# Patient Record
Sex: MALE | Race: WHITE | Employment: FULL TIME | URBAN - METROPOLITAN AREA
[De-identification: names, ages, dates, MRNs, and addresses within clinical notes are randomized per-mention and may not be internally consistent; named-entity substitution may affect disease eponyms.]

---

## 2024-08-14 ENCOUNTER — TELEPHONE (OUTPATIENT)
Dept: FAMILY MEDICINE | Facility: CLINIC | Age: 64
End: 2024-08-14
Payer: COMMERCIAL

## 2024-08-14 NOTE — TELEPHONE ENCOUNTER
----- Message from Tomeka Camiol, Patient Care Assistant sent at 8/14/2024  1:43 PM CDT -----  Regarding: appointment  Contact: Caridad arreaga's wife  Type:  Sooner Appointment Request    Caller is requesting a sooner appointment.  Caller declined first available appointment listed below.  Caller will not accept being placed on the waitlist and is requesting a message be sent to doctor.    Name of Caller:  Caridad arreaga's wife    When is the first available appointment?  2024    Symptoms:  new pcp     Would the patient rather a call back or a response via [facility]? Callback     Best Call Back Number:  [phone]     Additional Information:  please call to advise. Thanks!

## 2024-10-18 ENCOUNTER — OFFICE VISIT (OUTPATIENT)
Dept: FAMILY MEDICINE | Facility: CLINIC | Age: 64
End: 2024-10-18
Payer: COMMERCIAL

## 2024-10-18 VITALS
BODY MASS INDEX: 33.14 KG/M2 | WEIGHT: 231.5 LBS | OXYGEN SATURATION: 97 % | HEIGHT: 70 IN | SYSTOLIC BLOOD PRESSURE: 138 MMHG | HEART RATE: 96 BPM | DIASTOLIC BLOOD PRESSURE: 60 MMHG

## 2024-10-18 DIAGNOSIS — Z76.89 ENCOUNTER TO ESTABLISH CARE: ICD-10-CM

## 2024-10-18 DIAGNOSIS — I10 ESSENTIAL HYPERTENSION, BENIGN: ICD-10-CM

## 2024-10-18 DIAGNOSIS — Z13.220 ENCOUNTER FOR SCREENING FOR LIPID DISORDER: ICD-10-CM

## 2024-10-18 DIAGNOSIS — Z11.3 SCREEN FOR STD (SEXUALLY TRANSMITTED DISEASE): ICD-10-CM

## 2024-10-18 DIAGNOSIS — Z12.5 SPECIAL SCREENING, PROSTATE CANCER: Primary | ICD-10-CM

## 2024-10-18 DIAGNOSIS — Z13.1 DIABETES MELLITUS SCREENING: ICD-10-CM

## 2024-10-18 DIAGNOSIS — Z11.59 ENCOUNTER FOR HEPATITIS C SCREENING TEST FOR LOW RISK PATIENT: ICD-10-CM

## 2024-10-18 DIAGNOSIS — Z00.00 PREVENTATIVE HEALTH CARE: ICD-10-CM

## 2024-10-18 DIAGNOSIS — N40.0 BENIGN PROSTATIC HYPERPLASIA WITHOUT LOWER URINARY TRACT SYMPTOMS: ICD-10-CM

## 2024-10-18 RX ORDER — DILTIAZEM HYDROCHLORIDE EXTENDED-RELEASE TABLETS 360 MG/1
360 TABLET, EXTENDED RELEASE ORAL
COMMUNITY
Start: 2024-08-07

## 2024-10-18 RX ORDER — TADALAFIL 10 MG/1
TABLET ORAL
COMMUNITY

## 2024-10-18 NOTE — ASSESSMENT & PLAN NOTE
To consider adding a small dose of an Ace or an Arb to his calcium channel blocker  Diet , wt loss , exercise d/w patient

## 2024-10-18 NOTE — PROGRESS NOTES
Subjective:       Patient ID: Milo Nicole is a 64 y.o. male.    Chief Complaint: Establish Care (Patient is here to establish care. )      Mr Nicole is a new patient who presents today to establish care and for management of the chronic problems , refills and preventive medicine      Follow-up  This is a chronic problem. The current episode started more than 1 year ago. The problem has been unchanged. Pertinent negatives include no fever. The symptoms are aggravated by stress and eating. Treatments tried: See med list.     Review of Systems   Constitutional:  Negative for activity change, appetite change and fever.   Respiratory: Negative.     Cardiovascular: Negative.    Gastrointestinal: Negative.    Musculoskeletal: Negative.          Objective:      Physical Exam  Vitals and nursing note reviewed.   Constitutional:       Appearance: Normal appearance. He is obese.   Cardiovascular:      Rate and Rhythm: Normal rate and regular rhythm.      Pulses: Normal pulses.      Heart sounds: Normal heart sounds. No murmur heard.     No friction rub. No gallop.   Pulmonary:      Effort: Pulmonary effort is normal.      Breath sounds: Normal breath sounds. No wheezing.   Skin:     General: Skin is warm and dry.   Neurological:      Mental Status: He is alert.   Psychiatric:         Mood and Affect: Mood normal.         Assessment:       1. Special screening, prostate cancer  -     Prostate Specific Antigen, Diagnostic; Future; Expected date: 10/18/2024    2. Screen for STD (sexually transmitted disease)  -     HIV 1/2 Ag/Ab (4th Gen); Future; Expected date: 04/18/2025    3. Encounter for hepatitis C screening test for low risk patient  -     Hepatitis C Antibody; Future; Expected date: 10/18/2024    4. Encounter for screening for lipid disorder  -     Lipid Panel; Future; Expected date: 10/18/2024    5. Diabetes mellitus screening  -     Hemoglobin A1C; Future; Expected date: 10/18/2024    6. Essential hypertension,  benign  Overview:  Blood pressure near goal today    Assessment & Plan:  To consider adding a small dose of an Ace or an Arb to his calcium channel blocker  Diet , wt loss , exercise d/w patient      Orders:  -     Microalbumin/Creatinine Ratio, Urine; Future; Expected date: 10/18/2024  -     Comprehensive Metabolic Panel; Future; Expected date: 10/18/2024    7. Encounter to establish care  Overview:  Moved recently from [place]  History of SVT  History of hypertension  History of ED  History of BPH    Assessment & Plan:  Await old rec  Screen for diabetes lipids renal function  Screen for hep C HIV  Get a PSA      8. Benign prostatic hyperplasia without lower urinary tract symptoms  Overview:  Stable     Assessment & Plan:  Get PSA  Cont cialis      9. Preventative health care  Overview:  See below    Assessment & Plan:  We will discuss vaccination and colonoscope next             Plan:       1. Special screening, prostate cancer  -     Prostate Specific Antigen, Diagnostic; Future; Expected date: 10/18/2024    2. Screen for STD (sexually transmitted disease)  -     HIV 1/2 Ag/Ab (4th Gen); Future; Expected date: 04/18/2025    3. Encounter for hepatitis C screening test for low risk patient  -     Hepatitis C Antibody; Future; Expected date: 10/18/2024    4. Encounter for screening for lipid disorder  -     Lipid Panel; Future; Expected date: 10/18/2024    5. Diabetes mellitus screening  -     Hemoglobin A1C; Future; Expected date: 10/18/2024    6. Essential hypertension, benign  Overview:  Blood pressure near goal today    Assessment & Plan:  To consider adding a small dose of an Ace or an Arb to his calcium channel blocker  Diet , wt loss , exercise d/w patient      Orders:  -     Microalbumin/Creatinine Ratio, Urine; Future; Expected date: 10/18/2024  -     Comprehensive Metabolic Panel; Future; Expected date: 10/18/2024    7. Encounter to establish care  Overview:  Moved recently from [place]  History of SVT  History  of hypertension  History of ED  History of BPH    Assessment & Plan:  Await old rec  Screen for diabetes lipids renal function  Screen for hep C HIV  Get a PSA      8. Benign prostatic hyperplasia without lower urinary tract symptoms  Overview:  Stable     Assessment & Plan:  Get PSA  Cont cialis      9. Preventative health care  Overview:  See below    Assessment & Plan:  We will discuss vaccination and colonoscope next            Visit today included increased complexity associated with the care of the episodic problem.   I addressed and managing the longitudinal care of the patient due to the serious and/or complex managed problem(s).  .

## 2024-10-21 ENCOUNTER — LAB VISIT (OUTPATIENT)
Dept: LAB | Facility: CLINIC | Age: 64
End: 2024-10-21
Payer: COMMERCIAL

## 2024-10-21 DIAGNOSIS — Z13.1 DIABETES MELLITUS SCREENING: ICD-10-CM

## 2024-10-21 DIAGNOSIS — Z13.220 ENCOUNTER FOR SCREENING FOR LIPID DISORDER: ICD-10-CM

## 2024-10-21 DIAGNOSIS — Z11.3 SCREEN FOR STD (SEXUALLY TRANSMITTED DISEASE): ICD-10-CM

## 2024-10-21 DIAGNOSIS — Z12.5 SPECIAL SCREENING, PROSTATE CANCER: ICD-10-CM

## 2024-10-21 DIAGNOSIS — Z11.59 ENCOUNTER FOR HEPATITIS C SCREENING TEST FOR LOW RISK PATIENT: ICD-10-CM

## 2024-10-21 DIAGNOSIS — I10 ESSENTIAL HYPERTENSION, BENIGN: ICD-10-CM

## 2024-10-21 LAB
ALBUMIN SERPL BCP-MCNC: 3.9 G/DL (ref 3.5–5.2)
ALP SERPL-CCNC: 53 U/L (ref 40–150)
ALT SERPL W/O P-5'-P-CCNC: 10 U/L (ref 10–44)
ANION GAP SERPL CALC-SCNC: 10 MMOL/L (ref 8–16)
AST SERPL-CCNC: 13 U/L (ref 10–40)
BILIRUB SERPL-MCNC: 0.8 MG/DL (ref 0.1–1)
BUN SERPL-MCNC: 17 MG/DL (ref 8–23)
CALCIUM SERPL-MCNC: 9.3 MG/DL (ref 8.7–10.5)
CHLORIDE SERPL-SCNC: 105 MMOL/L (ref 95–110)
CHOLEST SERPL-MCNC: 183 MG/DL (ref 120–199)
CHOLEST/HDLC SERPL: 4.2 {RATIO} (ref 2–5)
CO2 SERPL-SCNC: 25 MMOL/L (ref 23–29)
COMPLEXED PSA SERPL-MCNC: 2.2 NG/ML (ref 0–4)
CREAT SERPL-MCNC: 1.1 MG/DL (ref 0.5–1.4)
EST. GFR  (NO RACE VARIABLE): >60 ML/MIN/1.73 M^2
ESTIMATED AVG GLUCOSE: 103 MG/DL (ref 68–131)
GLUCOSE SERPL-MCNC: 103 MG/DL (ref 70–110)
HBA1C MFR BLD: 5.2 % (ref 4–5.6)
HCV AB SERPL QL IA: NORMAL
HDLC SERPL-MCNC: 44 MG/DL (ref 40–75)
HDLC SERPL: 24 % (ref 20–50)
HIV 1+2 AB+HIV1 P24 AG SERPL QL IA: NORMAL
LDLC SERPL CALC-MCNC: 121.2 MG/DL (ref 63–159)
NONHDLC SERPL-MCNC: 139 MG/DL
POTASSIUM SERPL-SCNC: 4.4 MMOL/L (ref 3.5–5.1)
PROT SERPL-MCNC: 6.8 G/DL (ref 6–8.4)
SODIUM SERPL-SCNC: 140 MMOL/L (ref 136–145)
TRIGL SERPL-MCNC: 89 MG/DL (ref 30–150)

## 2024-10-21 PROCEDURE — 87389 HIV-1 AG W/HIV-1&-2 AB AG IA: CPT | Performed by: INTERNAL MEDICINE

## 2024-10-21 PROCEDURE — 80053 COMPREHEN METABOLIC PANEL: CPT | Performed by: INTERNAL MEDICINE

## 2024-10-21 PROCEDURE — 83036 HEMOGLOBIN GLYCOSYLATED A1C: CPT | Performed by: INTERNAL MEDICINE

## 2024-10-21 PROCEDURE — 80061 LIPID PANEL: CPT | Performed by: INTERNAL MEDICINE

## 2024-10-21 PROCEDURE — 84153 ASSAY OF PSA TOTAL: CPT | Performed by: INTERNAL MEDICINE

## 2024-10-21 PROCEDURE — 86803 HEPATITIS C AB TEST: CPT | Performed by: INTERNAL MEDICINE

## 2024-10-21 PROCEDURE — 36415 COLL VENOUS BLD VENIPUNCTURE: CPT | Mod: ,,, | Performed by: INTERNAL MEDICINE

## 2024-10-22 LAB
ALBUMIN/CREAT UR: 3.5 UG/MG (ref 0–30)
CREAT UR-MCNC: 172 MG/DL (ref 23–375)
MICROALBUMIN UR DL<=1MG/L-MCNC: 6 UG/ML

## 2024-12-06 ENCOUNTER — OFFICE VISIT (OUTPATIENT)
Dept: FAMILY MEDICINE | Facility: CLINIC | Age: 64
End: 2024-12-06
Payer: COMMERCIAL

## 2024-12-06 ENCOUNTER — TELEPHONE (OUTPATIENT)
Dept: FAMILY MEDICINE | Facility: CLINIC | Age: 64
End: 2024-12-06

## 2024-12-06 VITALS
HEART RATE: 72 BPM | DIASTOLIC BLOOD PRESSURE: 80 MMHG | BODY MASS INDEX: 33.02 KG/M2 | OXYGEN SATURATION: 98 % | SYSTOLIC BLOOD PRESSURE: 126 MMHG | WEIGHT: 230.63 LBS | HEIGHT: 70 IN

## 2024-12-06 DIAGNOSIS — Z00.00 ANNUAL PHYSICAL EXAM: ICD-10-CM

## 2024-12-06 DIAGNOSIS — N52.01 ERECTILE DYSFUNCTION DUE TO ARTERIAL INSUFFICIENCY: ICD-10-CM

## 2024-12-06 DIAGNOSIS — N52.9 ERECTILE DYSFUNCTION, UNSPECIFIED ERECTILE DYSFUNCTION TYPE: ICD-10-CM

## 2024-12-06 DIAGNOSIS — Z12.12 SCREENING FOR MALIGNANT NEOPLASM OF THE RECTUM: Primary | ICD-10-CM

## 2024-12-06 DIAGNOSIS — I10 ESSENTIAL HYPERTENSION, BENIGN: ICD-10-CM

## 2024-12-06 RX ORDER — SILDENAFIL 100 MG/1
100 TABLET, FILM COATED ORAL DAILY PRN
Qty: 10 TABLET | Refills: 2 | Status: SHIPPED | OUTPATIENT
Start: 2024-12-06 | End: 2025-03-06

## 2024-12-06 NOTE — ASSESSMENT & PLAN NOTE
Diet , wt loss , exercise d/w patient  I gave the patient verbal recommendations re the outstanding vaccinations.  Order a C-scope

## 2024-12-06 NOTE — TELEPHONE ENCOUNTER
Patient is wanting to know if there is something else that he can take besides sildenafiL (VIAGRA) 100 MG . Pt states that this medication is to high.

## 2024-12-06 NOTE — ASSESSMENT & PLAN NOTE
Switch to viagra 100mg  We had a lengthy discussion regarding T replacement and possible side effects including prostate cancer and patient declined

## 2024-12-06 NOTE — TELEPHONE ENCOUNTER
----- Message from Lacey sent at 12/6/2024  1:35 PM CST -----  Contact: self  Type:  Needs Medical Advice    Who Called: self  Symptoms (please be specific): pt is requesting to speak to dr or nurse about his medication, sildenafiL (VIAGRA) 100 MG tablet its too high and needed to know what he should do,  mentioned about good rx coupon. Please call pt to discuss.     Would the patient rather a call back or a response via [facility]? call  Best Call Back Number: [phone] (home)     Additional Information: please advise and thank you.

## 2024-12-06 NOTE — PROGRESS NOTES
Subjective:       Patient ID: Milo Nicole is a 64 y.o. male.    Chief Complaint: Follow-up and Annual Exam      History of Present Illness    CHIEF COMPLAINT:  Milo presents for a follow-up visit to discuss recent lab results and to address erectile dysfunction concerns.    HPI:  Milo reports erectile dysfunction (ED). He has been prescribed Tadalafil (Cialis) 10 mg, which has provided partial relief. He inquires about testosterone testing and alternative treatments for ED, expressing interest in potentially trying Viagra 100 mg. Milo is overdue for a colonoscopy, with a history of polyps found in previous screenings. During his last colonoscopy in [place], there was a concern about his cecum, which led to an MRI. The follow-up revealed an atypically shaped prostate. He subsequently had a bladder scope performed by a urologist, Dr. Max, which showed no abnormalities. Milo denies any issues with urination or bladder cancer.    MEDICATIONS:  Milo is on Tadalafil (Cialis) 10 mg for erectile dysfunction (ED), which is partially effective.    MEDICAL HISTORY:  Milo has a history of erectile dysfunction and colonic polyps. He has previously received a COVID-19 booster.    SURGICAL HISTORY:  Milo underwent a colonoscopy in the past where polyps were found and removed. He also had a bladder scope procedure performed by Dr. Max in [place], following concerns from the colonoscopy.    TEST RESULTS:  Recent lab results show all complete blood chemistry values within normal range. His cholesterol level was 183, A1C was 5.2, and PSA was normal. Hepatitis C and HIV tests were non-reactive. Urinalysis, kidney function, and liver function tests were all normal.    IMAGING:  Milo had an MRI in the past, performed after his colonoscopy in [place], which showed an atypically shaped prostate.         Review of Systems   Constitutional:  Negative for activity change, appetite change and fever.   Respiratory: Negative.      Cardiovascular: Negative.    Gastrointestinal: Negative.    Genitourinary:  Positive for erectile dysfunction.   Musculoskeletal: Negative.          Objective:      Physical Exam  Vitals and nursing note reviewed.   Constitutional:       Appearance: Normal appearance. He is obese.   Cardiovascular:      Rate and Rhythm: Normal rate and regular rhythm.      Pulses: Normal pulses.      Heart sounds: Normal heart sounds. No murmur heard.     No friction rub. No gallop.   Pulmonary:      Effort: Pulmonary effort is normal.      Breath sounds: Normal breath sounds. No wheezing.   Skin:     General: Skin is warm and dry.   Neurological:      Mental Status: He is alert.   Psychiatric:         Mood and Affect: Mood normal.         Assessment:       1. Screening for malignant neoplasm of the rectum  -     Case Request Endoscopy: COLONOSCOPY    2. Erectile dysfunction, unspecified erectile dysfunction type  -     sildenafiL (VIAGRA) 100 MG tablet; Take 1 tablet (100 mg total) by mouth daily as needed for Erectile Dysfunction.  Dispense: 10 tablet; Refill: 2    3. Annual physical exam  Overview:  Patient presents for a wellness visit  No new c/o today  Please refer to initial intake notes for screening/preventive measures  All histories and immunization schedule reviewed with patient      Assessment & Plan:  Diet , wt loss , exercise d/w patient  I gave the patient verbal recommendations re the outstanding vaccinations.  Order a C-scope        4. Erectile dysfunction due to arterial insufficiency  Overview:  Inadequately Rx    Assessment & Plan:  Switch to viagra 100mg  We had a lengthy discussion regarding T replacement and possible side effects including prostate cancer and patient declined              Plan:       Assessment & Plan    IMPRESSION:  - Reviewed patient's recent labs results, noting all values within normal range including cholesterol, A1C, PSA, and kidney/liver function  - Considered patient's history of  polyps in previous colonoscopy when ordering new screening colonoscopy  - Evaluated current ED treatment with Tadalafil 10mg, determining to try alternative medication (sildenafil) at higher dose  - Decided against pursuing testosterone therapy due to potential increased risk of prostate cancer    MALE ERECTILE DYSFUNCTION:  - Discussed availability and cost-effectiveness of generic ED medications through discount programs like [employer].  - Discontinued Tadalafil 10mg.  - Started sildenafil 100mg, #30 tablets with refills.    TESTICULAR HYPOFUNCTION:  - Explained rationale for avoiding testosterone therapy due to potential increased risk of prostate cancer.    SCREENING FOR COLON CANCER:  - Ordered screening colonoscopy.    GENERAL ADULT MEDICAL EXAMINATION:  - Follow up in 6 months.       Milo was seen today for follow-up and annual exam.    Diagnoses and all orders for this visit:    Screening for malignant neoplasm of the rectum  -     Case Request Endoscopy: COLONOSCOPY    Erectile dysfunction, unspecified erectile dysfunction type  -     sildenafiL (VIAGRA) 100 MG tablet; Take 1 tablet (100 mg total) by mouth daily as needed for Erectile Dysfunction.    Annual physical exam    Erectile dysfunction due to arterial insufficiency

## 2024-12-09 ENCOUNTER — PATIENT MESSAGE (OUTPATIENT)
Dept: GASTROENTEROLOGY | Facility: CLINIC | Age: 64
End: 2024-12-09
Payer: COMMERCIAL

## 2025-01-17 ENCOUNTER — ANESTHESIA (OUTPATIENT)
Dept: ENDOSCOPY | Facility: HOSPITAL | Age: 65
End: 2025-01-17
Payer: COMMERCIAL

## 2025-01-17 ENCOUNTER — ANESTHESIA EVENT (OUTPATIENT)
Dept: ENDOSCOPY | Facility: HOSPITAL | Age: 65
End: 2025-01-17
Payer: COMMERCIAL

## 2025-01-17 ENCOUNTER — HOSPITAL ENCOUNTER (OUTPATIENT)
Facility: HOSPITAL | Age: 65
Discharge: HOME | End: 2025-01-17
Attending: INTERNAL MEDICINE | Admitting: INTERNAL MEDICINE
Payer: COMMERCIAL

## 2025-01-17 VITALS
HEIGHT: 70 IN | HEART RATE: 70 BPM | DIASTOLIC BLOOD PRESSURE: 89 MMHG | BODY MASS INDEX: 32.93 KG/M2 | WEIGHT: 230 LBS | RESPIRATION RATE: 14 BRPM | TEMPERATURE: 98 F | OXYGEN SATURATION: 97 % | SYSTOLIC BLOOD PRESSURE: 151 MMHG

## 2025-01-17 DIAGNOSIS — K64.8 INTERNAL HEMORRHOIDS: ICD-10-CM

## 2025-01-17 DIAGNOSIS — K31.7 GASTRIC POLYPS: ICD-10-CM

## 2025-01-17 DIAGNOSIS — K63.5 POLYP OF COLON, UNSPECIFIED PART OF COLON, UNSPECIFIED TYPE: ICD-10-CM

## 2025-01-17 DIAGNOSIS — K57.90 DIVERTICULOSIS: ICD-10-CM

## 2025-01-17 DIAGNOSIS — K29.70 GASTRITIS, PRESENCE OF BLEEDING UNSPECIFIED, UNSPECIFIED CHRONICITY, UNSPECIFIED GASTRITIS TYPE: Primary | ICD-10-CM

## 2025-01-17 DIAGNOSIS — K44.9 HIATAL HERNIA: ICD-10-CM

## 2025-01-17 DIAGNOSIS — Z86.0100 HISTORY OF COLON POLYPS: ICD-10-CM

## 2025-01-17 PROCEDURE — 27201089 HC SNARE, DISP (ANY): Performed by: INTERNAL MEDICINE

## 2025-01-17 PROCEDURE — 63600175 PHARM REV CODE 636 W HCPCS: Performed by: NURSE ANESTHETIST, CERTIFIED REGISTERED

## 2025-01-17 PROCEDURE — 37000009 HC ANESTHESIA EA ADD 15 MINS: Performed by: INTERNAL MEDICINE

## 2025-01-17 PROCEDURE — D9220A PRA ANESTHESIA: Mod: CRNA,,, | Performed by: NURSE ANESTHETIST, CERTIFIED REGISTERED

## 2025-01-17 PROCEDURE — 27200997: Performed by: INTERNAL MEDICINE

## 2025-01-17 PROCEDURE — 25000003 PHARM REV CODE 250: Performed by: INTERNAL MEDICINE

## 2025-01-17 PROCEDURE — 45380 COLONOSCOPY AND BIOPSY: CPT | Mod: 22,59,33, | Performed by: INTERNAL MEDICINE

## 2025-01-17 PROCEDURE — 45385 COLONOSCOPY W/LESION REMOVAL: CPT | Mod: 22,59,33 | Performed by: INTERNAL MEDICINE

## 2025-01-17 PROCEDURE — 45385 COLONOSCOPY W/LESION REMOVAL: CPT | Mod: 22,59,33, | Performed by: INTERNAL MEDICINE

## 2025-01-17 PROCEDURE — 37000008 HC ANESTHESIA 1ST 15 MINUTES: Performed by: INTERNAL MEDICINE

## 2025-01-17 PROCEDURE — 27201028 HC NEEDLE, SCLERO: Performed by: INTERNAL MEDICINE

## 2025-01-17 PROCEDURE — 45380 COLONOSCOPY AND BIOPSY: CPT | Mod: 22,59,33 | Performed by: INTERNAL MEDICINE

## 2025-01-17 PROCEDURE — 45390 COLONOSCOPY W/RESECTION: CPT | Mod: 22,33,, | Performed by: INTERNAL MEDICINE

## 2025-01-17 PROCEDURE — 45390 COLONOSCOPY W/RESECTION: CPT | Mod: 22,33 | Performed by: INTERNAL MEDICINE

## 2025-01-17 PROCEDURE — D9220A PRA ANESTHESIA: Mod: ANES,,, | Performed by: ANESTHESIOLOGY

## 2025-01-17 PROCEDURE — 27201012 HC FORCEPS, HOT/COLD, DISP: Performed by: INTERNAL MEDICINE

## 2025-01-17 RX ORDER — SODIUM CHLORIDE 9 MG/ML
INJECTION, SOLUTION INTRAVENOUS CONTINUOUS
Status: DISCONTINUED | OUTPATIENT
Start: 2025-01-17 | End: 2025-01-17 | Stop reason: HOSPADM

## 2025-01-17 RX ORDER — LIDOCAINE HYDROCHLORIDE 20 MG/ML
INJECTION INTRAVENOUS
Status: DISCONTINUED | OUTPATIENT
Start: 2025-01-17 | End: 2025-01-17

## 2025-01-17 RX ORDER — PROPOFOL 10 MG/ML
VIAL (ML) INTRAVENOUS
Status: DISCONTINUED | OUTPATIENT
Start: 2025-01-17 | End: 2025-01-17

## 2025-01-17 RX ADMIN — PROPOFOL 40 MG: 10 INJECTION, EMULSION INTRAVENOUS at 07:01

## 2025-01-17 RX ADMIN — PROPOFOL 100 MG: 10 INJECTION, EMULSION INTRAVENOUS at 07:01

## 2025-01-17 RX ADMIN — SODIUM CHLORIDE: 9 INJECTION, SOLUTION INTRAVENOUS at 07:01

## 2025-01-17 RX ADMIN — LIDOCAINE HYDROCHLORIDE 100 MG: 20 INJECTION, SOLUTION INTRAVENOUS at 07:01

## 2025-01-17 NOTE — TRANSFER OF CARE
"Anesthesia Transfer of Care Note    Patient: Milo Nicole    Procedure(s) Performed: Procedure(s) (LRB):  COLONOSCOPY, SCREENING, HIGH RISK PATIENT (N/A)    Patient location: GI    Anesthesia Type: general    Transport from OR: Transported from OR on room air with adequate spontaneous ventilation    Post pain: adequate analgesia    Post assessment: no apparent anesthetic complications and tolerated procedure well    Post vital signs: stable    Level of consciousness: sedated and responds to stimulation    Nausea/Vomiting: no nausea/vomiting    Complications: none    Transfer of care protocol was followed      Last vitals: Visit Vitals  BP (!) 130/90 (BP Location: Left arm, Patient Position: Lying)   Pulse 66   Temp 36.5 °C (97.7 °F) (Skin)   Resp 16   Ht 5' 10" (1.778 m)   Wt 104.3 kg (230 lb)   SpO2 98%   BMI 33.00 kg/m²     "

## 2025-01-17 NOTE — PLAN OF CARE
Vss, keshawn po fluids, denies pain, ambulates easily. IV removed, catheter intact. Discharge instructions provided and states understanding. States ready to go home.  Discharged from facility with family per wheelchair.

## 2025-01-17 NOTE — ANESTHESIA PREPROCEDURE EVALUATION
01/17/2025  Milo Nicole is a 64 y.o., male.      Pre-op Assessment    I have reviewed the NPO Status.   I have reviewed the Medications.     Review of Systems  Anesthesia Hx:  No problems with previous Anesthesia                Cardiovascular:  Exercise tolerance: good   Hypertension                                    Hypertension         Hepatic/GI:  Bowel Prep.                   Endocrine:        Obesity / BMI > 30      Physical Exam  General: Well nourished    Airway:  Mallampati: II   Mouth Opening: Normal  TM Distance: Normal  Tongue: Normal  Neck ROM: Normal ROM    Dental:  Intact    Chest/Lungs:  Clear to auscultation, Normal Respiratory Rate        Anesthesia Plan  Type of Anesthesia, risks & benefits discussed:    Anesthesia Type: Gen Natural Airway  Intra-op Monitoring Plan: Standard ASA Monitors  Induction:  IV  Informed Consent: Informed consent signed with the Patient and all parties understand the risks and agree with anesthesia plan.  All questions answered.   ASA Score: 2    Ready For Surgery From Anesthesia Perspective.     .

## 2025-01-17 NOTE — PROVATION PATIENT INSTRUCTIONS
Discharge Summary/Instructions after an Endoscopic Procedure  Patient Name: Milo Nicole  Patient MRN: [id number]  Patient YOB: 1960 Friday, January 17, 2025  Matty Mercado MD  Dear patient,  As a result of recent federal legislation (The Federal Cures Act), you may   receive lab or pathology results from your procedure in your [facility]   account before your physician is able to contact you. Your physician or   their representative will relay the results to you with their   recommendations at their soonest availability.  Thank you,  RESTRICTIONS:  During your procedure today, you received medications for sedation.  These   medications may affect your judgment, balance and coordination.  Therefore,   for 24 hours, you have the following restrictions:   - DO NOT drive a car, operate machinery, make legal/financial decisions,   sign important papers or drink alcohol.    ACTIVITY:  Today: no heavy lifting, straining or running due to procedural   sedation/anesthesia.  The following day: return to full activity including work.  DIET:  Eat and drink normally unless instructed otherwise.     TREATMENT FOR COMMON SIDE EFFECTS:  - Mild abdominal pain, nausea, belching, bloating or excessive gas:  rest,   eat lightly and use a heating pad.  - Sore Throat: treat with throat lozenges and/or gargle with warm salt   water.  - Because air was used during the procedure, expelling large amounts of air   from your rectum or belching is normal.  - If a bowel prep was taken, you may not have a bowel movement for 1-3 days.    This is normal.  SYMPTOMS TO WATCH FOR AND REPORT TO YOUR PHYSICIAN:  1. Abdominal pain or bloating, other than gas cramps.  2. Chest pain.  3. Back pain.  4. Signs of infection such as: chills or fever occurring within 24 hours   after the procedure.  5. Rectal bleeding, which would show as bright red, maroon, or black stools.   (A tablespoon of blood from the rectum is not serious, especially  if   hemorrhoids are present.)  6. Vomiting.  7. Weakness or dizziness.  GO DIRECTLY TO THE NEAREST EMERGENCY ROOM IF YOU HAVE ANY OF THE FOLLOWING:      Difficulty breathing              Chills and/or fever over 101 F   Persistent vomiting and/or vomiting blood   Severe abdominal pain   Severe chest pain   Black, tarry stools   Bleeding- more than one tablespoon   Any other symptom or condition that you feel may need urgent attention  Your doctor recommends these additional instructions:  If any biopsies were taken, your doctors clinic will contact you in 1 to 2   weeks with any results.  - Patient has a contact number available for emergencies.  The signs and   symptoms of potential delayed complications were discussed with the   patient.  Return to normal activities tomorrow.  Written discharge   instructions were provided to the patient.   - High fiber diet.   - Continue present medications.   - No aspirin, ibuprofen, naproxen, or other non-steroidal anti-inflammatory   drugs for 2 weeks after polyp removal.   - Await pathology results.   - Repeat colonoscopy in 3 months for surveillance after piecemeal   polypectomy.   - Discharge patient to home (ambulatory).   - Return to GI office PRN.  For questions, problems or results please call your physician - Matty Mercado MD at Work:  [phone].  [facility], EMERGENCY ROOM PHONE NUMBER: [phone]  IF A COMPLICATION OR EMERGENCY SITUATION ARISES AND YOU ARE UNABLE TO REACH   YOUR PHYSICIAN - GO DIRECTLY TO THE EMERGENCY ROOM.  Matty Mercado MD  1/17/2025 8:07:23 AM  This report has been verified and signed electronically.  Dear patient,  As a result of recent federal legislation (The Federal Cures Act), you may   receive lab or pathology results from your procedure in your [facility]   account before your physician is able to contact you. Your physician or   their representative will relay the results to you with their   recommendations at their  soonest availability.  Thank you,  PROVATION

## 2025-01-27 ENCOUNTER — TELEPHONE (OUTPATIENT)
Dept: GASTROENTEROLOGY | Facility: CLINIC | Age: 65
End: 2025-01-27
Payer: COMMERCIAL

## 2025-01-27 ENCOUNTER — TELEPHONE (OUTPATIENT)
Dept: SURGERY | Facility: CLINIC | Age: 65
End: 2025-01-27
Payer: COMMERCIAL

## 2025-01-27 DIAGNOSIS — C18.9 MALIGNANT NEOPLASM OF COLON, UNSPECIFIED PART OF COLON: ICD-10-CM

## 2025-01-27 DIAGNOSIS — K63.5 DYSPLASTIC COLON POLYP: Primary | ICD-10-CM

## 2025-01-27 NOTE — TELEPHONE ENCOUNTER
I called patient and he stated that he's in [place], [place] working and wouldn't be able to come in tomorrow to see Dr. Hernandez in [place].  I scheduled him to see Dr. Hernandez on Tuesday, 2/4/25 @ 1pm in [place].  He stated that he's waiting to get a call to schedule his CT Scan.  Leonard

## 2025-01-29 DIAGNOSIS — N52.9 ERECTILE DYSFUNCTION, UNSPECIFIED ERECTILE DYSFUNCTION TYPE: ICD-10-CM

## 2025-01-29 RX ORDER — SILDENAFIL 100 MG/1
100 TABLET, FILM COATED ORAL DAILY PRN
Qty: 10 TABLET | Refills: 4 | Status: SHIPPED | OUTPATIENT
Start: 2025-01-29 | End: 2025-06-28

## 2025-01-29 NOTE — TELEPHONE ENCOUNTER
No care due was identified.  [facility] Embedded Care Due Messages. Reference number: [id number].   1/29/2025 8:28:28 AM CST

## 2025-01-29 NOTE — TELEPHONE ENCOUNTER
Refill Routing Note   Medication(s) are not appropriate for processing by [facility] for the following reason(s):        New or recently adjusted medication    [facility] action(s):  Defer             Appointments  past 12m or future 3m with PCP    Date Provider   Last Visit   12/6/2024 Kj Gloria MD   Next Visit   6/13/2025 Kj Gloria MD   ED visits in past 90 days: 0        Note composed:8:46 AM 01/29/2025

## 2025-01-30 ENCOUNTER — TELEPHONE (OUTPATIENT)
Dept: SURGERY | Facility: CLINIC | Age: 65
End: 2025-01-30
Payer: COMMERCIAL

## 2025-01-30 NOTE — TELEPHONE ENCOUNTER
I called patient and he was wondering if he should still see Dr. Hernandez on Tuesday, 2/4/25 @ 1pm in [place] if his CT Scan on Monday, 2/3/25 isn't approved.  I informed him that Dr. Hernandez can still see him without the CT Scan.  I'll check on the authorization tomorrow.  Leonard

## 2025-01-30 NOTE — TELEPHONE ENCOUNTER
----- Message from Gunjan sent at 1/30/2025  9:04 AM CST -----   Type:  Needs Medical Advice    Who Called:  PT    Would the patient rather a call back or a response via [facility]? Call  Best Call Back Number: [phone]        Additional Information:   pt states he have a CT 2/3/25 and is waiting on insurance to approved want to know if CT is needed for appt pn 2/4/25 Please call back to advise. Thank you!

## 2025-01-31 ENCOUNTER — TELEPHONE (OUTPATIENT)
Dept: GASTROENTEROLOGY | Facility: CLINIC | Age: 65
End: 2025-01-31
Payer: COMMERCIAL

## 2025-02-04 ENCOUNTER — OFFICE VISIT (OUTPATIENT)
Dept: SURGERY | Facility: CLINIC | Age: 65
End: 2025-02-04
Payer: COMMERCIAL

## 2025-02-04 ENCOUNTER — LAB VISIT (OUTPATIENT)
Dept: LAB | Facility: HOSPITAL | Age: 65
End: 2025-02-04
Attending: SURGERY
Payer: COMMERCIAL

## 2025-02-04 VITALS
HEIGHT: 70 IN | TEMPERATURE: 99 F | DIASTOLIC BLOOD PRESSURE: 104 MMHG | WEIGHT: 241.38 LBS | SYSTOLIC BLOOD PRESSURE: 184 MMHG | BODY MASS INDEX: 34.56 KG/M2 | HEART RATE: 59 BPM

## 2025-02-04 DIAGNOSIS — K63.5 DYSPLASTIC COLON POLYP: ICD-10-CM

## 2025-02-04 DIAGNOSIS — C18.9 MALIGNANT NEOPLASM OF COLON, UNSPECIFIED PART OF COLON: Primary | ICD-10-CM

## 2025-02-04 DIAGNOSIS — C18.9 MALIGNANT NEOPLASM OF COLON, UNSPECIFIED PART OF COLON: ICD-10-CM

## 2025-02-04 LAB — CEA SERPL-MCNC: 1.1 NG/ML (ref 0–5)

## 2025-02-04 PROCEDURE — 3077F SYST BP >= 140 MM HG: CPT | Mod: CPTII,S$GLB,, | Performed by: SURGERY

## 2025-02-04 PROCEDURE — 99999 PR PBB SHADOW E&M-EST. PATIENT-LVL III: CPT | Mod: PBBFAC,,, | Performed by: SURGERY

## 2025-02-04 PROCEDURE — 99204 OFFICE O/P NEW MOD 45 MIN: CPT | Mod: S$GLB,,, | Performed by: SURGERY

## 2025-02-04 PROCEDURE — 82378 CARCINOEMBRYONIC ANTIGEN: CPT | Performed by: SURGERY

## 2025-02-04 PROCEDURE — 3008F BODY MASS INDEX DOCD: CPT | Mod: CPTII,S$GLB,, | Performed by: SURGERY

## 2025-02-04 PROCEDURE — 1159F MED LIST DOCD IN RCRD: CPT | Mod: CPTII,S$GLB,, | Performed by: SURGERY

## 2025-02-04 PROCEDURE — 36415 COLL VENOUS BLD VENIPUNCTURE: CPT | Performed by: SURGERY

## 2025-02-04 PROCEDURE — 3080F DIAST BP >= 90 MM HG: CPT | Mod: CPTII,S$GLB,, | Performed by: SURGERY

## 2025-02-04 NOTE — PROGRESS NOTES
Subjective     Patient ID: Milo Nicole is a 64 y.o. male.    Chief Complaint: Consult (Evaluation for a colon resection)    HPI  Pleasant 63 yo M who is referred to me following surveillance cscope.  He was noted to have a 3 cm polyp in the rectosigmoid colon.  This was resected in a piecemeal fashion with hot snare after lift.  Visible negative resection margins.  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  He is not having pain or tenderness.  He is asymptomatic.  He has PMhx significant for HTN.  No significant PShx.     Review of Systems   Constitutional:  Negative for activity change and appetite change.   Respiratory:  Negative for apnea and shortness of breath.    Cardiovascular:  Negative for chest pain and claudication.   Gastrointestinal:  Negative for abdominal distention, abdominal pain, nausea and vomiting.   Hematological:  Negative for adenopathy.          Objective     Physical Exam  Vitals reviewed.   Cardiovascular:      Rate and Rhythm: Normal rate.      Pulses: Normal pulses.   Pulmonary:      Effort: Pulmonary effort is normal. No respiratory distress.   Abdominal:      General: Abdomen is flat. There is no distension.      Tenderness: There is no abdominal tenderness.      Hernia: No hernia is present.   Genitourinary:     Comments: Deferred per pt request    Musculoskeletal:         General: Normal range of motion.   Neurological:      General: No focal deficit present.      Mental Status: He is alert.   Psychiatric:         Mood and Affect: Mood normal.       Path:     3. RECTUM, BIOPSY:     --INTRAMUCOSAL CARCINOMA ARISING WITHIN FRAGMENTS OF TUBULOVILLOUS      ADENOMA.     --FRAGMENT OF BENIGN COLONIC MUCOSA WITH HYPERPLASTIC CHANGE AND      MUCOSAL LYMPHOID AGGREGATE.        Assessment and Plan     1. Dysplastic colon polyp  -     Ambulatory referral/consult to General Surgery    2. Malignant neoplasm of colon, unspecified part of colon  -      Ambulatory referral/consult to General Surgery  -     CEA; Future; Expected date: 02/04/2025        D?w pt.  Carcinoma in situ in a large TVA of rectosigmoid colon.   REcommend CEA and consideration for MRI.    D/w pt regarding surgical resection vs close observation.  Given in situ lesion with visibly negative margin would tend to recommend observation and clsoe surveillance.  Would find benefit in MRI to evaluate for an mesorectal LAD.    Pt agrees to f/u.  WIll have cscope in 3 monhts.  WIll need MRI in  early March         No follow-ups on file.

## 2025-02-05 ENCOUNTER — TELEPHONE (OUTPATIENT)
Dept: SURGERY | Facility: CLINIC | Age: 65
End: 2025-02-05
Payer: COMMERCIAL

## 2025-02-05 NOTE — TELEPHONE ENCOUNTER
I called patient to inform him that Dr. Hernandez wants him to get the MRI at the end of February or beginning of March.  I canceled the MRI for Friday, 2/14/25 @ 12:30pm @ [facility] and I'll call tomorrow to get him rescheduled towards the beginning of March.  Patient understood.  Leonard

## 2025-02-06 ENCOUNTER — TELEPHONE (OUTPATIENT)
Dept: SURGERY | Facility: CLINIC | Age: 65
End: 2025-02-06
Payer: COMMERCIAL

## 2025-02-06 NOTE — TELEPHONE ENCOUNTER
I called patient and he stated that he was scheduled for the MRI on Friday, 3/7/25 @ 8am @ [facility] MRI2.  Leonard

## 2025-04-17 ENCOUNTER — TELEPHONE (OUTPATIENT)
Dept: SURGERY | Facility: CLINIC | Age: 65
End: 2025-04-17
Payer: COMMERCIAL

## 2025-04-17 NOTE — TELEPHONE ENCOUNTER
I called patient and he stated that he had his MRI done on 4/4/25 @ [facility] in [place], [place].  Patient stated it was set up through [facility] since his insurance didn't cover it through [facility].  I informed him that I'll call [facility] in [place], [place]  [phone] on Monday, 4/21/25 to ask them to fax the MRI report to Dr. Hernandez.  Patient understood.  Leonard

## 2025-04-17 NOTE — TELEPHONE ENCOUNTER
I called patient to inform him that I called [facility] and Wendy will fax the MRI results to me.  I'll show Dr. Hernandez the MRI report on Tuesday and he'll call the patient with the results.  Patient was happy.  Leonard

## 2025-04-17 NOTE — TELEPHONE ENCOUNTER
----- Message from Alberta sent at 4/17/2025  2:00 PM CDT -----  Type: Needs Medical AdviceWho Called:  Patient Symptoms (please be specific):  How long has patient had these symptoms:  Pharmacy name and phone #:  Best Call Back Number: [phone] Information: Patient is requesting a call back from the nurse regarding MRI results.

## 2025-04-17 NOTE — TELEPHONE ENCOUNTER
I called and spoke to Wendy [facility] in [place] [facility] and she stated that if I fax a PATRICAI to [phone] then she can fax me the MRI results.  Leonard

## 2025-04-28 ENCOUNTER — PATIENT MESSAGE (OUTPATIENT)
Dept: SURGERY | Facility: CLINIC | Age: 65
End: 2025-04-28
Payer: COMMERCIAL

## 2025-04-28 DIAGNOSIS — Z86.0100 HISTORY OF COLON POLYPS: Primary | ICD-10-CM

## 2025-04-28 RX ORDER — SODIUM CHLORIDE 0.9 % (FLUSH) 0.9 %
10 SYRINGE (ML) INJECTION
OUTPATIENT
Start: 2025-04-28

## 2025-04-28 RX ORDER — SODIUM CHLORIDE, SODIUM LACTATE, POTASSIUM CHLORIDE, CALCIUM CHLORIDE 600; 310; 30; 20 MG/100ML; MG/100ML; MG/100ML; MG/100ML
INJECTION, SOLUTION INTRAVENOUS CONTINUOUS
OUTPATIENT
Start: 2025-04-28

## 2025-06-06 PROCEDURE — 82043 UR ALBUMIN QUANTITATIVE: CPT | Performed by: INTERNAL MEDICINE

## 2025-06-06 PROCEDURE — 80053 COMPREHEN METABOLIC PANEL: CPT | Performed by: INTERNAL MEDICINE

## 2025-06-13 ENCOUNTER — OFFICE VISIT (OUTPATIENT)
Dept: FAMILY MEDICINE | Facility: CLINIC | Age: 65
End: 2025-06-13
Payer: COMMERCIAL

## 2025-06-13 VITALS
HEART RATE: 81 BPM | DIASTOLIC BLOOD PRESSURE: 80 MMHG | WEIGHT: 250.44 LBS | BODY MASS INDEX: 35.85 KG/M2 | SYSTOLIC BLOOD PRESSURE: 138 MMHG | OXYGEN SATURATION: 99 % | HEIGHT: 70 IN

## 2025-06-13 DIAGNOSIS — M25.551 BILATERAL HIP PAIN: Primary | ICD-10-CM

## 2025-06-13 DIAGNOSIS — I48.0 PAROXYSMAL ATRIAL FIBRILLATION: ICD-10-CM

## 2025-06-13 DIAGNOSIS — Z28.21 PNEUMOCOCCAL VACCINATION DECLINED: ICD-10-CM

## 2025-06-13 DIAGNOSIS — M25.552 BILATERAL HIP PAIN: Primary | ICD-10-CM

## 2025-06-13 DIAGNOSIS — C18.9 ADENOCARCINOMA OF COLON: ICD-10-CM

## 2025-06-13 PROBLEM — G47.33 OBSTRUCTIVE SLEEP APNEA SYNDROME: Status: ACTIVE | Noted: 2025-06-13

## 2025-06-13 PROBLEM — Z76.89 ENCOUNTER TO ESTABLISH CARE: Status: RESOLVED | Noted: 2024-10-18 | Resolved: 2025-06-13

## 2025-06-13 PROBLEM — I10 ESSENTIAL HYPERTENSION, BENIGN: Status: RESOLVED | Noted: 2024-10-18 | Resolved: 2025-06-13

## 2025-06-13 PROBLEM — Z85.828 HISTORY OF BASAL CELL CARCINOMA: Status: ACTIVE | Noted: 2021-01-28

## 2025-06-13 PROBLEM — Z00.00 ANNUAL PHYSICAL EXAM: Status: RESOLVED | Noted: 2024-10-18 | Resolved: 2025-06-13

## 2025-06-13 PROBLEM — N52.01 ERECTILE DYSFUNCTION DUE TO ARTERIAL INSUFFICIENCY: Status: RESOLVED | Noted: 2024-12-06 | Resolved: 2025-06-13

## 2025-06-13 NOTE — PROGRESS NOTES
[facility]  Primary Care Clinic - [place], [place]    Family Medicine Office Visit    Subjective     Patient ID: Milo Nicole is a 64 y.o. male who presents to clinic today to .     Medical history, surgical history, medications, allergies, and social history were reviewed and updated.     Chief Complaint: Health Maintenance    HPI    Establish care  He presents today to establish care. We have reviewed medical history, surgical history, family history, medications, allergies, and social history. EMR was updated appropriately.     Hip pain  He reports a back injury a few months ago.  He reported that he had significant issues with movement and sleep, but this slowly improved over a few weeks.  He reported that he is now having some worsening pain in his hips and lower extremities.  He reported that they are certain motions that trigger his pain and that he feels that his muscles are tight.  He was initially requesting referral to Orthopedics, but we discussed that he may be better served by Physical therapy 1st.  He is agreeable with this plan and we will place referral to physical therapy for further evaluation.  Discussed that I do believe this has possibly be related to compensation associated with his recent back injury.    Paroxysmal atrial fibrillation  He reported that a few years ago he received an incidental electric shock and that has heart went into an abnormal rhythm.  He reported that he then went through cardioversion and was started on diltiazem and aspirin 325 mg daily.  He reported that he has since moved to our area in his not seen a cardiologist since.  He reported that he cut back on his aspirin as he was having significant bleeding and bruising issues.  He is not sure if he needed to continue the aspirin long term or if there needed to be further discussion regarding his atrial fibrillation.  Recommended that we get him in with Cardiology to discuss this further.  Recommended that we  get him in through [place] in the event that he would be a good candidate for ablation with electrophysiology.  No tachycardia, lightheadedness, or dizziness at this time.    Adenocarcinoma of colon  He was found to have a large polyp that tested positive for adenocarcinoma via pathology in January of this year.  He has since seen by Colorectal surgery with recommendations to complete MRI and repeat colonoscopy in 3 months.  He completed MRI at [facility] that showed no acute findings.  He does have some enlargement of his prostate, but no findings consistent with metastatic colon cancer.  He is scheduled to undergo colonoscopy later this month.    Vitals:    06/13/25 0857   BP: 138/80   Pulse: 81      Wt Readings from Last 3 Encounters:   06/13/25 0857 113.6 kg (250 lb 7.1 oz)   02/04/25 1250 109.5 kg (241 lb 6.5 oz)   01/17/25 0657 104.3 kg (230 lb)      Review of Systems    Review of Systems otherwise negative unless specified above.        Objective     Physical Exam  Vitals reviewed.   Constitutional:       General: He is not in acute distress.     Appearance: Normal appearance. He is obese.   HENT:      Head: Normocephalic and atraumatic.      Nose: Nose normal.      Mouth/Throat:      Mouth: Mucous membranes are moist.   Cardiovascular:      Rate and Rhythm: Normal rate and regular rhythm.      Heart sounds: No murmur heard.  Pulmonary:      Effort: Pulmonary effort is normal. No respiratory distress.      Breath sounds: Normal breath sounds.   Musculoskeletal:         General: Normal range of motion.   Skin:     General: Skin is warm and dry.   Neurological:      General: No focal deficit present.      Mental Status: He is alert and oriented to person, place, and time.   Psychiatric:         Mood and Affect: Mood normal.         Behavior: Behavior normal.            Assessment and Plan     Current Outpatient Medications   Medication Instructions    aspirin 162.5 mg Cp24     diltiaZEM (CARDIZEM LA) 360 mg         1. Bilateral hip pain  -     Ambulatory Referral/Consult to Physical Therapy; Future; Expected date: 06/20/2025    2. Paroxysmal atrial fibrillation  -     Ambulatory referral/consult to Cardiology; Future; Expected date: 06/20/2025    3. Adenocarcinoma of colon    4. Pneumococcal vaccination declined        Placing referral to physical therapy and Cardiology per his request.  Recommended he move forward with the completing his colonoscopy as recommended by colorectal surgery.  CEA was within normal limits.  Offered pneumonia vaccine, but he declined today.  We will otherwise plan to have him follow up in October when he is due for his annual visit.    Visit today included increased complexity associated with the care of the episodic problem paroxysmal atrial fibrillation addressed and managing the longitudinal care of the patient due to the serious and/or complex managed problem(s) paroxysmal atrial fibrillation.         Follow up in about 4 months (around 10/13/2025) for Follow up with Amirah.    Questions were invited and answered. No other acute concerns at this time. Will plan to follow up as above or sooner if needed.     sIabel Macario, DO  06/13/2025 11:23 AM       This dictation has been generated using Modal Fluency Dictation. Some phonetic errors may occur. Please contact author for clarification if needed.

## 2025-06-13 NOTE — PATIENT INSTRUCTIONS
Elijah Pedraza,     If you are due for any health screening(s) below please notify me so we can arrange them to be ordered and scheduled. Most healthy patients at your age complete them, but you are free to accept or refuse.     If you can't do it, I'll definitely understand. If you can, I'd certainly appreciate it!    All of your core healthy metrics are met.

## 2025-06-19 ENCOUNTER — TELEPHONE (OUTPATIENT)
Dept: SURGERY | Facility: CLINIC | Age: 65
End: 2025-06-19
Payer: COMMERCIAL

## 2025-06-19 ENCOUNTER — PATIENT MESSAGE (OUTPATIENT)
Dept: SURGERY | Facility: CLINIC | Age: 65
End: 2025-06-19
Payer: COMMERCIAL

## 2025-06-20 ENCOUNTER — ANESTHESIA EVENT (OUTPATIENT)
Dept: ENDOSCOPY | Facility: HOSPITAL | Age: 65
End: 2025-06-20
Payer: COMMERCIAL

## 2025-06-20 ENCOUNTER — ANESTHESIA (OUTPATIENT)
Dept: ENDOSCOPY | Facility: HOSPITAL | Age: 65
End: 2025-06-20
Payer: COMMERCIAL

## 2025-06-20 ENCOUNTER — HOSPITAL ENCOUNTER (OUTPATIENT)
Facility: HOSPITAL | Age: 65
Discharge: HOME | End: 2025-06-20
Attending: SURGERY | Admitting: SURGERY
Payer: COMMERCIAL

## 2025-06-20 VITALS
WEIGHT: 245 LBS | BODY MASS INDEX: 35.07 KG/M2 | RESPIRATION RATE: 15 BRPM | SYSTOLIC BLOOD PRESSURE: 140 MMHG | TEMPERATURE: 98 F | DIASTOLIC BLOOD PRESSURE: 86 MMHG | HEART RATE: 74 BPM | OXYGEN SATURATION: 98 % | HEIGHT: 70 IN

## 2025-06-20 DIAGNOSIS — Z86.0100 HISTORY OF COLON POLYPS: ICD-10-CM

## 2025-06-20 PROCEDURE — 63600175 PHARM REV CODE 636 W HCPCS: Mod: PO | Performed by: NURSE ANESTHETIST, CERTIFIED REGISTERED

## 2025-06-20 PROCEDURE — 88305 TISSUE EXAM BY PATHOLOGIST: CPT | Mod: TC | Performed by: SURGERY

## 2025-06-20 PROCEDURE — 37000008 HC ANESTHESIA 1ST 15 MINUTES: Mod: PO | Performed by: SURGERY

## 2025-06-20 PROCEDURE — 45380 COLONOSCOPY AND BIOPSY: CPT | Mod: 33,,, | Performed by: SURGERY

## 2025-06-20 PROCEDURE — 63600175 PHARM REV CODE 636 W HCPCS: Mod: PO | Performed by: SURGERY

## 2025-06-20 PROCEDURE — 45380 COLONOSCOPY AND BIOPSY: CPT | Mod: 33,PO | Performed by: SURGERY

## 2025-06-20 PROCEDURE — 37000009 HC ANESTHESIA EA ADD 15 MINS: Mod: PO | Performed by: SURGERY

## 2025-06-20 PROCEDURE — 27201089 HC SNARE, DISP (ANY): Mod: PO | Performed by: SURGERY

## 2025-06-20 RX ORDER — PROPOFOL 10 MG/ML
VIAL (ML) INTRAVENOUS CONTINUOUS PRN
Status: DISCONTINUED | OUTPATIENT
Start: 2025-06-20 | End: 2025-06-20

## 2025-06-20 RX ORDER — SODIUM CHLORIDE, SODIUM LACTATE, POTASSIUM CHLORIDE, CALCIUM CHLORIDE 600; 310; 30; 20 MG/100ML; MG/100ML; MG/100ML; MG/100ML
INJECTION, SOLUTION INTRAVENOUS CONTINUOUS
Status: DISCONTINUED | OUTPATIENT
Start: 2025-06-20 | End: 2025-06-20 | Stop reason: HOSPADM

## 2025-06-20 RX ORDER — PROPOFOL 10 MG/ML
VIAL (ML) INTRAVENOUS
Status: DISCONTINUED | OUTPATIENT
Start: 2025-06-20 | End: 2025-06-20

## 2025-06-20 RX ORDER — SODIUM CHLORIDE 0.9 % (FLUSH) 0.9 %
10 SYRINGE (ML) INJECTION
Status: DISCONTINUED | OUTPATIENT
Start: 2025-06-20 | End: 2025-06-20 | Stop reason: HOSPADM

## 2025-06-20 RX ORDER — LIDOCAINE HYDROCHLORIDE 20 MG/ML
INJECTION INTRAVENOUS
Status: DISCONTINUED | OUTPATIENT
Start: 2025-06-20 | End: 2025-06-20

## 2025-06-20 RX ADMIN — LIDOCAINE HYDROCHLORIDE 75 MG: 20 INJECTION INTRAVENOUS at 10:06

## 2025-06-20 RX ADMIN — PROPOFOL 150 MG: 10 INJECTION, EMULSION INTRAVENOUS at 10:06

## 2025-06-20 RX ADMIN — PROPOFOL 200 MCG/KG/MIN: 10 INJECTION, EMULSION INTRAVENOUS at 10:06

## 2025-06-20 RX ADMIN — SODIUM CHLORIDE, POTASSIUM CHLORIDE, SODIUM LACTATE AND CALCIUM CHLORIDE: 600; 310; 30; 20 INJECTION, SOLUTION INTRAVENOUS at 09:06

## 2025-06-20 NOTE — ANESTHESIA PREPROCEDURE EVALUATION
06/20/2025  Milo Niocle is a 64 y.o., male.      Pre-op Assessment    I have reviewed the Patient Summary Reports.     I have reviewed the Nursing Notes. I have reviewed the NPO Status.   I have reviewed the Medications.     Review of Systems  Anesthesia Hx:  No problems with previous Anesthesia                Social:  Non-Smoker       Cardiovascular:  Exercise tolerance: good   Hypertension    Dysrhythmias atrial fibrillation                               Hypertension     Atrial Fibrillation     Pulmonary:        Sleep Apnea     Obstructive Sleep Apnea (JIMY).           Hepatic/GI:  Bowel Prep.                   Endocrine:        Obesity / BMI > 30      Physical Exam  General: Well nourished    Airway:  Mallampati: II   Mouth Opening: Normal  TM Distance: Normal  Tongue: Normal  Neck ROM: Normal ROM    Dental:  Intact    Chest/Lungs:  Clear to auscultation, Normal Respiratory Rate    Heart:  Rate: Normal        Anesthesia Plan  Type of Anesthesia, risks & benefits discussed:    Anesthesia Type: Gen Natural Airway  Intra-op Monitoring Plan: Standard ASA Monitors  Induction:  IV  Informed Consent: Informed consent signed with the Patient and all parties understand the risks and agree with anesthesia plan.  All questions answered.   ASA Score: 2  Day of Surgery Review of History & Physical: H&P Update referred to the surgeon/provider.    Ready For Surgery From Anesthesia Perspective.     .

## 2025-06-20 NOTE — TRANSFER OF CARE
"Anesthesia Transfer of Care Note    Patient: Milo Nicole    Procedure(s) Performed: Procedure(s) (LRB):  COLONOSCOPY, SCREENING, HIGH RISK PATIENT (N/A)    Patient location: PACU    Anesthesia Type: general    Transport from OR: Transported from OR on room air with adequate spontaneous ventilation    Post pain: adequate analgesia    Post assessment: no apparent anesthetic complications and tolerated procedure well    Post vital signs: stable    Level of consciousness: sedated    Nausea/Vomiting: no nausea/vomiting    Complications: none    Transfer of care protocol was followed      Last vitals: Visit Vitals  BP (!) 150/84 (BP Location: Right arm, Patient Position: Sitting)   Pulse 74   Temp 36.6 °C (97.8 °F) (Skin)   Resp 17   Ht 5' 10" (1.778 m)   Wt 111.1 kg (245 lb)   SpO2 96%   BMI 35.15 kg/m²     "

## 2025-06-20 NOTE — H&P
"[facility] - Endoscopy  History & Physical     Subjective:     Chief Complaint/Reason for Admission: hx of colon polyps    History of Present Illness:   Patient 64 y.o. male presents for surveillance csocpe.  PT has history of colon cancer removed endoscopically in January.  Denies abd pain or changes inb owel habits.  No significant PMxh otherwise.  No significant PShx.     Patient Active Problem List    Diagnosis Date Noted    Obstructive sleep apnea syndrome 06/13/2025    Adenocarcinoma of colon 06/13/2025    History of basal cell carcinoma 01/28/2021    Paroxysmal atrial fibrillation 07/17/2020        Prescriptions Prior to Admission[1]  Review of patient's allergies indicates:  No Known Allergies     Past Medical History:   Diagnosis Date    Arthritis     History of colonic polyps     Skin cancer       Past Surgical History:   Procedure Laterality Date    CHOLECYSTECTOMY      COLONOSCOPY, SCREENING, HIGH RISK PATIENT N/A 1/17/2025    Procedure: COLONOSCOPY, SCREENING, HIGH RISK PATIENT;  Surgeon: Matty Galicia MD;  Location: [facility];  Service: Endoscopy;  Laterality: N/A;  ppm    HERNIA REPAIR      KNEE ARTHROSCOPY Right     ROTATOR CUFF REPAIR Left         Social History     Tobacco Use    Smoking status: Never     Passive exposure: Never    Smokeless tobacco: Never   Substance Use Topics    Alcohol use: Never        Review of Systems:  A comprehensive review of systems was negative.    OBJECTIVE:     Patient Vitals for the past 8 hrs:   BP Temp Temp src Pulse Resp SpO2 Height Weight   06/20/25 0928 (!) 150/84 97.8 °F (36.6 °C) Skin 74 17 96 % 5' 10" (1.778 m) 111.1 kg (245 lb)     AAOx3  Sinus  Soft/nd/nt  No resp distress    Data Review:      ASSESSMENT/PLAN:     There are no hospital problems to display for this patient.    Hx of colon cancer  Plan:  TO have cscope today         [1]   Medications Prior to Admission   Medication Sig Dispense Refill Last Dose/Taking    aspirin 162.5 mg Cp24    Past Week "    diltiaZEM (CARDIZEM LA) 360 mg 24 hr tablet Take 360 mg by mouth.   Past Week

## 2025-06-20 NOTE — PLAN OF CARE
VSS, pt denies any pain or questions at this time. Medications reviewed, pt ready for procedure. Pt safety maintained, call light within reach.

## 2025-06-20 NOTE — PROVATION PATIENT INSTRUCTIONS
Discharge Summary/Instructions after an Endoscopic Procedure  Patient Name: Milo Nicole  Patient MRN: [id number]  Patient YOB: 1960 Friday, June 20, 2025  Damir Hernandez MD  Dear patient,  As a result of recent federal legislation (The Federal Cures Act), you may   receive lab or pathology results from your procedure in your [facility]   account before your physician is able to contact you. Your physician or   their representative will relay the results to you with their   recommendations at their soonest availability.  Thank you,  RESTRICTIONS:  During your procedure today, you received medications for sedation.  These   medications may affect your judgment, balance and coordination.  Therefore,   for 24 hours, you have the following restrictions:   - DO NOT drive a car, operate machinery, make legal/financial decisions,   sign important papers or drink alcohol.    ACTIVITY:  Today: no heavy lifting, straining or running due to procedural   sedation/anesthesia.  The following day: return to full activity including work.  DIET:  Eat and drink normally unless instructed otherwise.     TREATMENT FOR COMMON SIDE EFFECTS:  - Mild abdominal pain, nausea, belching, bloating or excessive gas:  rest,   eat lightly and use a heating pad.  - Sore Throat: treat with throat lozenges and/or gargle with warm salt   water.  - Because air was used during the procedure, expelling large amounts of air   from your rectum or belching is normal.  - If a bowel prep was taken, you may not have a bowel movement for 1-3 days.    This is normal.  SYMPTOMS TO WATCH FOR AND REPORT TO YOUR PHYSICIAN:  1. Abdominal pain or bloating, other than gas cramps.  2. Chest pain.  3. Back pain.  4. Signs of infection such as: chills or fever occurring within 24 hours   after the procedure.  5. Rectal bleeding, which would show as bright red, maroon, or black stools.   (A tablespoon of blood from the rectum is not serious, especially if    hemorrhoids are present.)  6. Vomiting.  7. Weakness or dizziness.  GO DIRECTLY TO THE NEAREST EMERGENCY ROOM IF YOU HAVE ANY OF THE FOLLOWING:      Difficulty breathing              Chills and/or fever over 101 F   Persistent vomiting and/or vomiting blood   Severe abdominal pain   Severe chest pain   Black, tarry stools   Bleeding- more than one tablespoon   Any other symptom or condition that you feel may need urgent attention  Your doctor recommends these additional instructions:  If any biopsies were taken, your doctors clinic will contact you in 1 to 2   weeks with any results.  You are being discharged to home.   Resume your regular diet.   Continue your present medications.   We are waiting for your pathology results.   Your physician has recommended a repeat colonoscopy in one year for   surveillance.   Return to my office as needed.  For questions, problems or results please call your physician - Damir Hernandez MD at Work:  [phone].  EMERGENCY PHONE NUMBER: [phone], LAB RESULTS: [phone]  IF A COMPLICATION OR EMERGENCY SITUATION ARISES AND YOU ARE UNABLE TO REACH   YOUR PHYSICIAN - GO DIRECTLY TO THE EMERGENCY ROOM.  ___________________________________________  Nurse Signature  ___________________________________________  Patient/Designated Responsible Party Signature  Damir Hernandez MD  6/20/2025 10:27:50 AM  This report has been verified and signed electronically.  Dear patient,  As a result of recent federal legislation (The Federal Cures Act), you may   receive lab or pathology results from your procedure in your [facility]   account before your physician is able to contact you. Your physician or   their representative will relay the results to you with their   recommendations at their soonest availability.  Thank you.  PROVATION

## 2025-06-20 NOTE — ANESTHESIA POSTPROCEDURE EVALUATION
Anesthesia Post Evaluation    Patient: Milo Nicole    Procedure(s) Performed: Procedure(s) (LRB):  COLONOSCOPY, SCREENING, HIGH RISK PATIENT (N/A)    Final Anesthesia Type: general      Patient location during evaluation: PACU  Patient participation: Yes- Able to Participate  Level of consciousness: awake and alert  Post-procedure vital signs: reviewed and stable  Pain management: adequate  Airway patency: patent    PONV status at discharge: No PONV  Anesthetic complications: no      Cardiovascular status: hemodynamically stable  Respiratory status: unassisted and room air  Hydration status: euvolemic  Follow-up not needed.              Vitals Value Taken Time   /86 06/20/25 10:51     06/20/25 12:20   Pulse 74 06/20/25 10:51   Resp 15 06/20/25 10:51   SpO2 98 % 06/20/25 10:51         Event Time   Out of Recovery 11:05:39         Pain/Nkechi Score: Nkechi Score: 10 (6/20/2025 10:49 AM)

## 2025-06-24 LAB
ESTROGEN SERPL-MCNC: NORMAL PG/ML
INSULIN SERPL-ACNC: NORMAL U[IU]/ML
LAB AP CLINICAL INFORMATION: NORMAL
LAB AP DIAGNOSIS CATEGORY: NORMAL
LAB AP GROSS DESCRIPTION: NORMAL
LAB AP PERFORMING LOCATION(S): NORMAL
LAB AP REPORT FOOTNOTES: NORMAL

## 2025-07-01 ENCOUNTER — PATIENT MESSAGE (OUTPATIENT)
Dept: FAMILY MEDICINE | Facility: CLINIC | Age: 65
End: 2025-07-01

## 2025-07-11 ENCOUNTER — CLINICAL SUPPORT (OUTPATIENT)
Dept: REHABILITATION | Facility: HOSPITAL | Age: 65
End: 2025-07-11
Payer: COMMERCIAL

## 2025-07-11 ENCOUNTER — TELEPHONE (OUTPATIENT)
Dept: SURGERY | Facility: HOSPITAL | Age: 65
End: 2025-07-11

## 2025-07-11 DIAGNOSIS — M25.552 BILATERAL HIP PAIN: ICD-10-CM

## 2025-07-11 DIAGNOSIS — R68.89 IMPAIRED TOLERANCE OF ACTIVITY: Primary | ICD-10-CM

## 2025-07-11 DIAGNOSIS — M25.551 BILATERAL HIP PAIN: ICD-10-CM

## 2025-07-11 PROCEDURE — 97161 PT EVAL LOW COMPLEX 20 MIN: CPT | Mod: PN

## 2025-07-11 PROCEDURE — 97112 NEUROMUSCULAR REEDUCATION: CPT | Mod: PN

## 2025-07-11 NOTE — TELEPHONE ENCOUNTER
Called and attempted to reviewed pathology with pt.        Final Diagnosis   Fragment of nonneoplastic colonic mucosa with a prominent lymphoid aggregate         Pt did not answer  Recommend repeat cscope in 1 years    WIll have office reach out to pt

## 2025-07-12 ENCOUNTER — PATIENT MESSAGE (OUTPATIENT)
Dept: FAMILY MEDICINE | Facility: CLINIC | Age: 65
End: 2025-07-12

## 2025-07-12 NOTE — PROGRESS NOTES
Outpatient Rehab    Physical Therapy Evaluation    Patient Name: Milo Nicole  MRN: [id number]  YOB: 1960  Encounter Date: 7/11/2025    Therapy Diagnosis:   Encounter Diagnoses   Name Primary?    Bilateral hip pain     Impaired tolerance of activity Yes     Physician: Isabel Macario DO    Physician Orders: Eval and Treat  Medical Diagnosis: Bilateral hip pain  Surgical Diagnosis: Not applicable for this Episode   Surgical Date: Not applicable for this Episode  Days Since Last Surgery: Not applicable for this Episode    Visit # / Visits Authorized:  1 / 1  Insurance Authorization Period: 6/13/2025 to 6/13/2026  Date of Evaluation: 7/11/2025  Plan of Care Certification: 7/11/2025 to 10/7/2025     Time In: 0700   Time Out: 0800  Total Time (in minutes): 60   Total Billable Time (in minutes): 60    Intake Outcome Measure for FOTO Survey    Therapist reviewed FOTO scores for Milo Nicole on 7/11/2025.   FOTO report - see Media section or FOTO account episode details.     Intake Score:  %    Precautions:       Subjective   History of Present Illness  Milo is a 64 y.o. male who reports to physical therapy with a chief concern of RIght hip pain with movement, some activities; Difficulty crossing right leg over left to reach feet; increased discomfort sitting on motorcycle - bilateral hips, but right more than left..         Diagnostic tests related to this condition: None.        Dominant Hand: Right  History of Present Condition/Illness: Milo reports several month history of right hip pain with loss of ROM; Left hip bothers him only periodically; He reports right hip pain when he lifts his leg to get into bed; crosses his right leg over the left - cannot reach his foot - can do this on the other side (L) with less difficulty.  When questioned - he did report fall about 3 months ago - had some back pain initially, but this has resolved.  Milo and his wife are active - ride their bikes at least 10  miles everyday; walk daily - several miles; perform kettle bell exercises via video several times per week. 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  Milo is interested in learning what he can do to improve his hip pain, worried about hip replacement; hasn't seen a ortho - suggestion by PCP was to start with PT first.     Activities of Daily Living  Social history was obtained from Patient and Spouse.    General Prior Level of Function Comments: Independent, active, working; walks, bike rides, kettle bell exercises  General Current Level of Function Comments: Same as PLOF.  Patient Responsibilities: Community mobility, Driving, Health management, Home management, Financial management, Personal ADL, Yard work, Shopping    Previously independent with activities of daily living? Yes     Currently independent with activities of daily living? Yes          Previously independent with instrumental activities of daily living? Yes     Currently independent with instrumental activities of daily living? Yes              Pain     Patient reports a current pain level of 3/10. Pain at best is reported as 3/10. Pain at worst is reported as 6/10.   Location: Pain 5-6 /10 from right hip to knee when he has pain  Clinical Progression (since onset): Stable  Pain Qualities: Aching, Burning, Discomfort, Tightness, Tenderness, Pulling, Radiating  Pain-Relieving Factors: Activity modification, Change in position, Exercise, Medications - over-the-counter  Pain-Aggravating Factors: Movement, Sitting, Stair climbing, Squatting, Lifting, Driving         Living Arrangements  Living Situation  Housing: Home independently  Living Arrangements: Spouse/significant other  Support Systems: Spouse/significant other    Home Setup  Type of Structure: House  Home Access: Level entry  Number of Levels in Home: One  level        Employment  Patient does not report that: Does the patient's condition impact their ability to work?  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       Past Medical History/Physical Systems Review:   Milo Nicole  has a past medical history of Arthritis, History of colonic polyps, and Skin cancer.    Milo Nicole  has a past surgical history that includes Cholecystectomy; Hernia repair; Rotator cuff repair (Left); Knee arthroscopy (Right); colonoscopy, screening, high risk patient (N/A, 1/17/2025); and colonoscopy, screening, high risk patient (N/A, 6/20/2025).    Milo has a current medication list which includes the following prescription(s): aspirin and diltiazem.    Review of patient's allergies indicates:  No Known Allergies     Objective   Posture                 Slight forward head, forward shoulders; flattened lumbar spine; stands with wide RADHA, varus knees - left more than right     Knee Observations  Right Knee Observations  Not Present: Straight Leg Raise Extensor Lag  Left Knee Observations  Not Present: Straight Leg Raise Extensor Lag             Hip Range of Motion   Right Hip   Active (deg) Passive (deg) Pain   Flexion 93 100  (discomfort at end range)   Extension 5 8     ABduction 20 25  (discomfort at end range)   ADduction 23 26     External Rotation 90/90         External Rotation Prone 30 30     Internal Rotation 90/90 3 5 Yes   Internal Rotation Prone             Left Hip   Active (deg) Passive (deg) Pain   Flexion 100 105     Extension 5 10     ABduction 22 30     ADduction 25 30     External Rotation 90/90         External Rotation Prone 45 45     Internal Rotation 90/90 3 5 Yes   Internal Rotation Prone             Tightness noted in bilateral hamstrings, bilateral hip flexors/ quads; surprisingly ITB on right and left is WFL.     Knee Range of Motion   Right Knee   Active (deg) Passive (deg) Pain   Flexion 125       Extension 0            Left Knee   Active (deg) Passive (deg) Pain   Flexion 123       Extension 0                Ankle/Foot Range of Motion      Tightness noted in bilateral calf/achilles; patient unable to keep heels on ground with squat; difficult standing on incline wedge secondary to tightness in achilles. Reports history of PF year ago as well.               Hip Strength - Planes of Motion   Right Strength Right Pain Left Strength Left  Pain   Flexion (L2) 5   5     Extension 4   4     ABduction 4+   4+     ADduction 4+   4+     Internal Rotation 4+   4+     External Rotation 4+   4+         Knee Strength   Right Strength Right Pain Left Strength Left  Pain   Flexion (S2) 5   5     Prone Flexion 4+   4+     Extension (L3) 5   5       Knee Extensor Lag  No Lag: Right and Left       Ankle/Foot Strength - Planes of Motion   Right Strength Right Pain Left Strength Left  Pain   Dorsiflexion (L4) 5   5     Plantar Flexion (S1) 5   5     Inversion 5   5     Eversion 5   5     Great Toe Flexion 5   5     Great Toe Extension (L5) 5   5     Lesser Toes Flexion 5   5     Lesser Toes Extension 5   5            Hip Special Tests  Flex/Imbalance/Structural Tests  Positive: Right Ely's, Left Ely's, Right Long Sit, Left Long Sit, Right Talita's, and Left Talita's  Kee Test  Positive: Right and Left  Right Kee Test Hip Flexion: 30  Left Kee Test Hip Flexion: 20  Right Kee Test Knee Flexion: 90  Left Kee Test Knee Flexion: 90       Knee Special Tests  Knee Flexibility Tests  Positive: Right Ely's, Left Ely's, Right Talita's, and Left Talita's                     Treatment:  Balance/Neuromuscular Re-Education  NMR 1: 3-way hamstring stretching with strap - minimum 30 sec in each position - bilateral LE's  NMR 2: Long sitting - hip flexor/knee stretch with strap - 30 sec hold x 3  NMR 3: Piriformis stretch - 30 sec x 3  NMR 4: SUpine - hip flexor/quad stretch - leg off side of stable, minimum 30 sec x 3  NMR 5: Hip abduction stretch -  knee outs - gentle push against knee - 30 sec hold > progress range > 30 sec hold  NMR 6: Calf stretch on wedge - minimum 30 sec hold x 3-4  NMR 7: Bent over hip extension - knee straight and flexed  NMR 8: Use of foam roller to work on hamstring/calf mm tissue extensibility and to help decrease mm cramps    Time Entry(in minutes):  PT Evaluation (Low) Time Entry: 30  Neuromuscular Re-Education Time Entry: 30    Assessment & Plan   Assessment  Milo presents with a condition of Low complexity.   Presentation of Symptoms: Stable  Will Comorbidities Impact Care: No       Functional Limitations: Activity tolerance, Functional mobility, Range of motion, Participating in leisure activities, Squatting, Other (Comment)  Other Functional Limitations: putting on shoes/socks right foot; reaching feet from sitting position  Impairments: Lack of appropriate home exercise program, Abnormal or restricted range of motion, Activity intolerance, Other (Comment)  Other Impairments: Tissue extensibility hamstrings, calf mm, hip flexors, lower back mm    Patient Goal for Therapy (PT): To improve right hip pain and AROM, improve HEP to address pain and tightness  Prognosis: Good  Assessment Details: Milo is 63 yo with several month history of right hip pain - from hip to knee - notices it most when lifting his right leg to get into bed, crossing his right leg over the left in sitting - cannot reach his foot; sitting on motorcycle.  He wants to make sure that he is doing whatever he can to improve this situation and avoid any type of surgery if it is not necessary. Milo and his wife are very active - riding bike 10 miles + daily, walking several miles couple times per week, they also perform a kettle bell exercise routine via video several times per week.  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  He does demonstrate some loss of ROM in his  right hip versus left, primarily in IR, ER; loss of flexibility in lumbar spine into flexion with hip flexion; significant tissue tightness/loss of extensibility in hamstrings, calf mm, hip flexors/quads and lumbar fascia that restricts his AROM as well.  Strength is functional in bilateral LE's; does have some decreased mm activation in gluteals as result of his exercise activities are all sagittal plane based bike riding, walking.  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 Milo will benefit from a good HEP to address fascia tightness - stretching activities for hamstrings, hip flexors/quad, calf /achilles along with foam rolling for tissue tightness and to prevent cramping ( that he has) in his hamstrings.  Reviewed an initial HEP with patient today.  Will progress as he is able.     Plan  From a physical therapy perspective, the patient would benefit from: Skilled Rehab Services    Planned therapy interventions include: Therapeutic exercise, Therapeutic activities, Neuromuscular re-education, and Manual therapy.            Visit Frequency: 1 times Per Week for 6 Weeks.       This plan was discussed with Patient and Therapy assistant.   Discussion participants: Agreed Upon Plan of Care             The patient's spiritual, cultural, and educational needs were considered, and the patient is agreeable to the plan of care and goals.     Education  Education was done with Patient. The patient's learning style includes Demonstration and Listening. The patient Verbalizes understanding and Demonstrates understanding.         Need for stretching to improve joint ROM, tissue extensibility; Importance of HEP, importance of diversifying exercise activities.  Attendance with therapy appointments and HEP        Goals:   Active       LTG's        Perform all transfers without limitation        Start:  07/11/25    Expected End:  08/22/25            Improvement in right hip AROM by 10 degrees in all  planes except IR.        Start:  07/11/25    Expected End:  08/22/25            Improvement in tissue extensibility in LE's        Start:  07/11/25    Expected End:  08/22/25            Able to perform all previous restricted movements in right hip with less limitation        Start:  07/11/25    Expected End:  08/22/25            Independent with HEP for continued improvement in function and mobility/flexibility        Start:  07/11/25    Expected End:  08/22/25               STG's        Demonstrate improvement in recent symptoms to progress toward LTG's        Start:  07/11/25    Expected End:  08/01/25            Improve postural deficits to reduce pain; promote improvement in postural awareness for injury prevention        Start:  07/11/25    Expected End:  08/01/25            Demonstrate compliance with initial exercise program        Start:  07/11/25    Expected End:  08/01/25                Aissatou Costa, PT

## 2025-07-14 ENCOUNTER — TELEPHONE (OUTPATIENT)
Dept: SURGERY | Facility: CLINIC | Age: 65
End: 2025-07-14
Payer: COMMERCIAL

## 2025-07-14 NOTE — TELEPHONE ENCOUNTER
ource   Milo Nicole (Patient)    Subject   Milo Nicole (Patient)    Topic   General Inquiry - Return Call      Summary   Return Call   Communication   Type:  Patient Returning Call            Who Called:pt            Who Left Message for Patient: Dr Hernandez            Does the patient know what this is regarding?: results            Would the patient rather a call back or a response via [facility]?  Call back            Best Call Back Number: [phone]            Additional Information:  please call to advise            Thanks

## 2025-07-17 NOTE — TELEPHONE ENCOUNTER
I called patient to inform him that his colonoscopy was benign per Dr. Hernandez and he wants him to repeat the colonoscopy in 1 year.  Patient understood.  Leonard

## 2025-07-18 ENCOUNTER — CLINICAL SUPPORT (OUTPATIENT)
Dept: REHABILITATION | Facility: HOSPITAL | Age: 65
End: 2025-07-18
Payer: COMMERCIAL

## 2025-07-18 DIAGNOSIS — R68.89 IMPAIRED TOLERANCE OF ACTIVITY: Primary | ICD-10-CM

## 2025-07-18 DIAGNOSIS — M25.651 HIP STIFFNESS, RIGHT: ICD-10-CM

## 2025-07-18 PROCEDURE — 97530 THERAPEUTIC ACTIVITIES: CPT | Mod: PN

## 2025-07-18 PROCEDURE — 97140 MANUAL THERAPY 1/> REGIONS: CPT | Mod: PN

## 2025-07-18 PROCEDURE — 97112 NEUROMUSCULAR REEDUCATION: CPT | Mod: PN

## 2025-07-18 NOTE — PROGRESS NOTES
Outpatient Rehab    Physical Therapy Visit    Patient Name: Milo Nicole  MRN: [id number]  YOB: 1960  Encounter Date: 7/18/2025    Therapy Diagnosis:   Encounter Diagnoses   Name Primary?    Impaired tolerance of activity Yes    Hip stiffness, right      Physician: Isabel Macario DO    Physician Orders: Eval and Treat  Medical Diagnosis: Bilateral hip pain  Surgical Diagnosis: Not applicable for this Episode   Surgical Date: Not applicable for this Episode  Days Since Last Surgery: Not applicable for this Episode    Visit # / Visits Authorized:  1 / 12  Insurance Authorization Period: 7/18/2025 to 12/31/2025  Date of Evaluation: 7/11/2025  Plan of Care Certification:       PT/PTA: PT   Number of PTA visits since last PT visit:0  Time In: 0700   Time Out: 0800  Total Time (in minutes): 60   Total Billable Time (in minutes): 60    FOTO:  Intake Score (%): 62  Survey Score 2 (%): Not applicable for this Episode  Survey Score 3 (%): Not applicable for this Episode    Precautions:         Subjective   I've been working on my exercises at home, Just don't understand what happened - a year ago I could sit on my motor cycle without problems, now I can't even get on the regular bicycle..  Pain reported as 3/10. right hip    Objective            Treatment:  Manual Therapy  MT 1: Inferior mobilization right femur; posterior capsule stretching followed by passive stretching through all planes of motion;  Balance/Neuromuscular Re-Education  NMR 1: 3-way hamstring stretching with strap - minimum 30 sec in each position - bilateral LE's  NMR 2: Piriformis stretch - 30 sec x 3 - perform on both sides - use towel to assist in raising opposite leg  NMR 3: Figure 4 stretch - right hip - 1 min x 2  NMR 4: Quadruped: rock backwards to stretch hips - knees wider apart to increase posterior motion  NMR 5: Quadruped - cat/cow x 6  NMR 6: Calf stretch on wedge - minimum 30 sec hold x 3-4  NMR 7: Bent over hip extension -  knee straight and flexed 10 x 2  NMR 8: Seated: hip hinging - forward lean - neutral spine, hands on thighs - hold x 10 sec x 10  Therapeutic Activity  TA 1: Wall squats with ball x 30  TA 2: Side-stepping - wide steps with slight squat    Time Entry(in minutes):  Manual Therapy Time Entry: 15  Neuromuscular Re-Education Time Entry: 30  Therapeutic Activity Time Entry: 10    Assessment & Plan   Assessment: Milo did well with initial therapy session today; Education given - 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  With dedication, he can improve his movement, but he has to incorporate the stretching activities into his daily routine.  Instructed in how to use aquatics to enhance program without as much pain/stress on joints.  Will see patient 1x/week to accommodate work schedule.    Evaluation/Treatment Tolerance: Patient tolerated treatment well    The patient will continue to benefit from skilled outpatient physical therapy in order to address the deficits listed in the problem list on the initial evaluation, provide patient and family education, and maximize the patients level of independence in the home and community environments.     The patient's spiritual, cultural, and educational needs were considered, and the patient is agreeable to the plan of care and goals.           Plan: Continue with SKilled physical therapy to address right hip pain/immobility, lower back pain as outlined in initial POC.  WIll see patient 1/week to accommodate work schedule. Patient compliant with HEP    Goals:   Active       LTG's        Perform all transfers without limitation        Start:  07/11/25    Expected End:  08/22/25            Improvement in right hip AROM by 10 degrees in all planes except IR.        Start:  07/11/25    Expected End:  08/22/25            Improvement in  tissue extensibility in LE's        Start:  07/11/25    Expected End:  08/22/25            Able to perform all previous restricted movements in right hip with less limitation        Start:  07/11/25    Expected End:  08/22/25            Independent with HEP for continued improvement in function and mobility/flexibility        Start:  07/11/25    Expected End:  08/22/25               STG's        Demonstrate improvement in recent symptoms to progress toward LTG's  (Progressing)       Start:  07/11/25    Expected End:  08/01/25            Improve postural deficits to reduce pain; promote improvement in postural awareness for injury prevention  (Progressing)       Start:  07/11/25    Expected End:  08/01/25            Demonstrate compliance with initial exercise program  (Progressing)       Start:  07/11/25    Expected End:  08/01/25                Aissatou Costa PT

## 2025-07-31 ENCOUNTER — PATIENT MESSAGE (OUTPATIENT)
Dept: FAMILY MEDICINE | Facility: CLINIC | Age: 65
End: 2025-07-31

## 2025-08-01 ENCOUNTER — OFFICE VISIT (OUTPATIENT)
Dept: CARDIOLOGY | Facility: CLINIC | Age: 65
End: 2025-08-01
Payer: COMMERCIAL

## 2025-08-01 VITALS
DIASTOLIC BLOOD PRESSURE: 86 MMHG | BODY MASS INDEX: 34.96 KG/M2 | HEIGHT: 70 IN | SYSTOLIC BLOOD PRESSURE: 142 MMHG | HEART RATE: 81 BPM | WEIGHT: 244.19 LBS | OXYGEN SATURATION: 96 %

## 2025-08-01 DIAGNOSIS — I48.0 PAROXYSMAL ATRIAL FIBRILLATION: ICD-10-CM

## 2025-08-01 DIAGNOSIS — I10 HYPERTENSION, UNSPECIFIED TYPE: Primary | ICD-10-CM

## 2025-08-01 PROCEDURE — 99999 PR PBB SHADOW E&M-EST. PATIENT-LVL IV: CPT | Mod: PBBFAC,,, | Performed by: INTERNAL MEDICINE

## 2025-08-01 NOTE — PROGRESS NOTES
"Subjective:    Patient ID:  Milo Nicole is a 64 y.o. male patient here for evaluation Establish Care (Hx of tachycardia)      History of Present Illness:     64-year-old male here for establishment of care referred from primary care.  Few years ago prior to COVID pandemic, patient had an accidental shock at work and went to the ER with rapid heart rate and was given 2 injections which resolved the arrhythmia.  Patient unsure of the name of the arrhythmia.  Questionable atrial fibrillation versus SVT.  No records available to review.  Physically very active at baseline and exercises regularly with no anginal symptoms.  Patient stated he had echocardiogram and stress test a little over a year ago at his primary care's office.        Review of patient's allergies indicates:  No Known Allergies    Past Medical History:   Diagnosis Date    Arthritis     History of colonic polyps     Skin cancer      Past Surgical History:   Procedure Laterality Date    CHOLECYSTECTOMY      COLONOSCOPY, SCREENING, HIGH RISK PATIENT N/A 1/17/2025    Procedure: COLONOSCOPY, SCREENING, HIGH RISK PATIENT;  Surgeon: Matty Galicia MD;  Location: [facility];  Service: Endoscopy;  Laterality: N/A;  ppm    COLONOSCOPY, SCREENING, HIGH RISK PATIENT N/A 6/20/2025    Procedure: COLONOSCOPY, SCREENING, HIGH RISK PATIENT;  Surgeon: Damir Hernandez MD;  Location: [facility];  Service: General;  Laterality: N/A;    HERNIA REPAIR      KNEE ARTHROSCOPY Right     ROTATOR CUFF REPAIR Left      Social History[1]     Review of Systems   Negative except as mentioned in HPI         Objective        Vitals:    08/01/25 0836   BP: (!) 142/86   Pulse:        LIPIDS - LAST 2   Lab Results   Component Value Date    CHOL 183 10/21/2024    HDL 44 10/21/2024    LDLCALC 121.2 10/21/2024    TRIG 89 10/21/2024    CHOLHDL 24.0 10/21/2024       CBC - LAST 2  No results found for: "WBC", "RBC", "HGB", "HCT", "MCV", "MCH", "MCHC", "RDW", "PLT", "MPV", "GRAN", "LYMPH", " ""MONO", "BASO", "NRBC"    CHEMISTRY & LIVER FUNCTION - LAST 2  Lab Results   Component Value Date     06/06/2025     10/21/2024    K 4.4 06/06/2025    K 4.4 10/21/2024     06/06/2025     10/21/2024    CO2 25 06/06/2025    CO2 25 10/21/2024    ANIONGAP 7 (L) 06/06/2025    ANIONGAP 10 10/21/2024    BUN 16 06/06/2025    BUN 17 10/21/2024    CREATININE 1.0 06/06/2025    CREATININE 1.1 10/21/2024     06/06/2025     10/21/2024    CALCIUM 9.2 06/06/2025    CALCIUM 9.3 10/21/2024    ALBUMIN 3.9 06/06/2025    ALBUMIN 3.9 10/21/2024    PROT 6.9 06/06/2025    PROT 6.8 10/21/2024    ALKPHOS 56 06/06/2025    ALKPHOS 53 10/21/2024    ALT 19 06/06/2025    ALT 10 10/21/2024    AST 14 06/06/2025    AST 13 10/21/2024    BILITOT 0.7 06/06/2025    BILITOT 0.8 10/21/2024        CARDIAC PROFILE - LAST 2  No results found for: "BNP", "CPK", "CPKMB", "LDH", "TROPONINI", "TROPONINIHS"     COAGULATION - LAST 2  No results found for: "LABPT", "INR", "APTT"    ENDOCRINE & PSA - LAST 2  Lab Results   Component Value Date    HGBA1C 5.2 10/21/2024        ECHOCARDIOGRAM RESULTS  No results found for this or any previous visit.      CURRENT/PREVIOUS VISIT EKG  No results found for this or any previous visit.  No valid procedures specified.   No results found for this or any previous visit.    No valid procedures specified.          PREVIOUS STRESS TEST              PREVIOUS ANGIOGRAM        PHYSICAL EXAM    CONSTITUTIONAL: Well built, well nourished in no apparent distress  HEENT: No pallor  NECK: no JVD  LUNGS: CTA b/l  HEART: regular rate and rhythm, S1, S2 normal, no murmur   ABDOMEN:  Nondistended  EXTREMITIES: No edema  NEURO: AAO X 3   SKIN:  No rash  Psych:  Normal affect    I HAVE REVIEWED :    The vital signs, nurses notes, and all the pertinent recent radiology studies, cardiovascular studies and labs.  I have independently reviewed the patient's most recent EKG.        Current Outpatient Medications "   Medication Instructions    aspirin 162.5 mg Cp24     diltiaZEM (CARDIZEM LA) 360 mg        ECG reviewed by me: PHYLLIS  Assessment and Plan     64-year-old male here for establishment of care referred from primary care.  Few years ago prior to COVID pandemic, patient had an accidental shock at work and went to the ER with rapid heart rate and was given 2 injections which resolved the arrhythmia.  Patient unsure of the name of the arrhythmia.  Questionable atrial fibrillation versus SVT.  No records available to review.  Physically very active at baseline and exercises regularly with no anginal symptoms.  Patient stated he had echocardiogram and stress test a little over a year ago at his primary care's office.    It is unclear if patient had SVT or atrial fibrillation in the past.  Continue diltiazem 360 and aspirin.  Patient states he bleeds easily with minor cuts.    Seven day event monitor rule out any arrhythmia.  Patient reports intermittent strong heartbeats when he lies down to sleep.  Obtain Records from prior primary care and cardiology office including echo and stress test reports.  Advised home BP monitoring    Follow up in about 4 weeks (around 8/29/2025).     Hypertension, unspecified type    Paroxysmal atrial fibrillation  -     Ambulatory referral/consult to Cardiology  -     IN OFFICE EKG 12-LEAD (to [facility])  -     Cancel: Echo Saline Bubble? No; Ultrasound enhancing contrast? No; Future; Expected date: 08/08/2025  -     Cardiac Monitor - 3-15 Day Adult ([facility] Only); Future; Expected date: 08/02/2025              [1]   Social History  Tobacco Use    Smoking status: Never     Passive exposure: Never    Smokeless tobacco: Never   Substance Use Topics    Alcohol use: Never    Drug use: Never

## 2025-08-08 ENCOUNTER — CLINICAL SUPPORT (OUTPATIENT)
Dept: REHABILITATION | Facility: HOSPITAL | Age: 65
End: 2025-08-08
Payer: COMMERCIAL

## 2025-08-08 ENCOUNTER — HOSPITAL ENCOUNTER (OUTPATIENT)
Dept: CARDIOLOGY | Facility: CLINIC | Age: 65
Discharge: HOME | End: 2025-08-08
Attending: INTERNAL MEDICINE
Payer: COMMERCIAL

## 2025-08-08 DIAGNOSIS — I48.0 PAROXYSMAL ATRIAL FIBRILLATION: ICD-10-CM

## 2025-08-08 DIAGNOSIS — R68.89 IMPAIRED TOLERANCE OF ACTIVITY: Primary | ICD-10-CM

## 2025-08-08 DIAGNOSIS — M25.651 HIP STIFFNESS, RIGHT: ICD-10-CM

## 2025-08-08 PROCEDURE — 97112 NEUROMUSCULAR REEDUCATION: CPT | Mod: PN

## 2025-08-08 PROCEDURE — 97140 MANUAL THERAPY 1/> REGIONS: CPT | Mod: PN

## 2025-08-09 NOTE — PROGRESS NOTES
Outpatient Rehab    Physical Therapy Progress Note    Patient Name: Milo Nicole  MRN: [id number]  YOB: 1960  Encounter Date: 8/8/2025    Therapy Diagnosis:   Encounter Diagnoses   Name Primary?    Impaired tolerance of activity Yes    Hip stiffness, right      Physician: Isabel Macario DO    Physician Orders: Eval and Treat  Medical Diagnosis: Bilateral hip pain  Surgical Diagnosis: Not applicable for this Episode   Surgical Date: Not applicable for this Episode  Days Since Last Surgery: Not applicable for this Episode    Visit # / Visits Authorized:  2 / 12  Insurance Authorization Period: 7/18/2025 to 12/31/2025  Date of Evaluation: 7/11/2025  Plan of Care Certification:    Progress Note Date Range: 7/11/2025 to 8/8/2025     PT/PTA: PT   Number of PTA visits since last PT visit:0  Time In: 0700   Time Out: 0800  Total Time (in minutes): 60   Total Billable Time (in minutes): 55    FOTO:  Intake Score (%): 62  Survey Score 2 (%): Not applicable for this Episode  Survey Score 3 (%): Not applicable for this Episode    Precautions:       Subjective   I have been out of town for 2 weeks - haven't even thought about my exercise..  Pain reported as 3/10. right hip    Objective    Patient has only completed evaluation and 2 visits - has been out of town on business and just getting back into routine.  No signficant change in mobility/flexibility noted as this time.         Treatment:  Manual Therapy  MT 1: Grade II mobilization of right femur  - inferior, lateral, posterior, anterior to improve ROM/movement  MT 2: Prone - MET for right hip ER - contract/relax x 6 sec, move into new range and repeat x 6  Balance/Neuromuscular Re-Education  NMR 1: 3-way hamstring stretching with strap - minimum 30 sec in each position - bilateral LE's  NMR 3: Figure 4 stretch - right hip - 1 min x 2  NMR 4: Quadruped: rock backwards to stretch hips - knees wider apart to increase posterior motion  NMR 5: Quadruped -  cat/cow x 6  NMR 6: Calf stretch on wedge - minimum 30 sec hold x 3-4  NMR 7: Bent over hip extension - knee straight and flexed 10 x 2  NMR 8: Seated: hip hinging - forward lean - neutral spine, hands on thighs - hold x 10 sec x 10  Therapeutic Activity  TA 1: Wall squats with ball x 30  TA 3: Recumbent bike - Level 6 x 15 mins    Time Entry(in minutes):  Manual Therapy Time Entry: 25  Neuromuscular Re-Education Time Entry: 30    Assessment & Plan   Assessment: Milo has been out of town for past couple of weeks; hasn't had time for any exercises; Discussed hip flexibility as well as general tightness in lumbar spine, neck/shoulders.  Needs to get back into regular exercise; will continue with therapy 1x/week to address flexibility deficits.        The patient will continue to benefit from skilled outpatient physical therapy in order to address the deficits listed in the problem list on the initial evaluation, provide patient and family education, and maximize the patients level of independence in the home and community environments.     The patient's spiritual, cultural, and educational needs were considered, and the patient is agreeable to the plan of care and goals.           Plan: Continue with SKilled physical therapy to address right hip pain/immobility, lower back pain as outlined in initial POC.  WIll see patient 1/week to accommodate work schedule. Patient compliant with [facility]    Goals:   Active       LTG's        Perform all transfers without limitation        Start:  07/11/25    Expected End:  08/22/25            Improvement in right hip AROM by 10 degrees in all planes except IR.        Start:  07/11/25    Expected End:  08/22/25            Improvement in tissue extensibility in LE's        Start:  07/11/25    Expected End:  08/22/25            Able to perform all previous restricted movements in right hip with less limitation        Start:  07/11/25    Expected End:  08/22/25            Independent with  HEP for continued improvement in function and mobility/flexibility        Start:  07/11/25    Expected End:  08/22/25               STG's        Demonstrate improvement in recent symptoms to progress toward LTG's  (Progressing)       Start:  07/11/25    Expected End:  08/22/25            Improve postural deficits to reduce pain; promote improvement in postural awareness for injury prevention  (Progressing)       Start:  07/11/25    Expected End:  08/22/25            Demonstrate compliance with initial exercise program  (Progressing)       Start:  07/11/25    Expected End:  08/22/25                Aissatou Costa, PT

## 2025-08-10 ENCOUNTER — PATIENT MESSAGE (OUTPATIENT)
Dept: FAMILY MEDICINE | Facility: CLINIC | Age: 65
End: 2025-08-10
Payer: COMMERCIAL

## 2025-08-15 ENCOUNTER — CLINICAL SUPPORT (OUTPATIENT)
Dept: REHABILITATION | Facility: HOSPITAL | Age: 65
End: 2025-08-15
Payer: COMMERCIAL

## 2025-08-15 DIAGNOSIS — R68.89 IMPAIRED TOLERANCE OF ACTIVITY: Primary | ICD-10-CM

## 2025-08-15 DIAGNOSIS — M25.651 HIP STIFFNESS, RIGHT: ICD-10-CM

## 2025-08-15 PROCEDURE — 97112 NEUROMUSCULAR REEDUCATION: CPT | Mod: PN

## 2025-08-15 PROCEDURE — 97140 MANUAL THERAPY 1/> REGIONS: CPT | Mod: PN

## 2025-08-15 PROCEDURE — 97530 THERAPEUTIC ACTIVITIES: CPT | Mod: PN

## 2025-08-15 RX ORDER — DILTIAZEM HYDROCHLORIDE EXTENDED-RELEASE TABLETS 360 MG/1
360 TABLET, EXTENDED RELEASE ORAL DAILY
Qty: 90 TABLET | Refills: 0 | Status: CANCELLED | OUTPATIENT
Start: 2025-08-15

## 2025-08-18 DIAGNOSIS — I48.0 PAROXYSMAL ATRIAL FIBRILLATION: ICD-10-CM

## 2025-08-18 DIAGNOSIS — I48.0 PAROXYSMAL ATRIAL FIBRILLATION: Primary | ICD-10-CM

## 2025-08-18 RX ORDER — DILTIAZEM HYDROCHLORIDE EXTENDED-RELEASE TABLETS 360 MG/1
360 TABLET, EXTENDED RELEASE ORAL DAILY
Qty: 30 TABLET | Refills: 1 | Status: SHIPPED | OUTPATIENT
Start: 2025-08-18 | End: 2025-08-18 | Stop reason: SDUPTHER

## 2025-08-19 ENCOUNTER — PATIENT MESSAGE (OUTPATIENT)
Dept: FAMILY MEDICINE | Facility: CLINIC | Age: 65
End: 2025-08-19
Payer: COMMERCIAL

## 2025-08-19 DIAGNOSIS — I10 PRIMARY HYPERTENSION: Primary | ICD-10-CM

## 2025-08-19 RX ORDER — VALSARTAN 160 MG/1
160 TABLET ORAL DAILY
Qty: 30 TABLET | Refills: 1 | Status: SHIPPED | OUTPATIENT
Start: 2025-08-19 | End: 2026-08-19

## 2025-08-19 RX ORDER — DILTIAZEM HYDROCHLORIDE EXTENDED-RELEASE TABLETS 360 MG/1
360 TABLET, EXTENDED RELEASE ORAL DAILY
Qty: 30 TABLET | Refills: 1 | Status: SHIPPED | OUTPATIENT
Start: 2025-08-19

## 2025-08-22 ENCOUNTER — CLINICAL SUPPORT (OUTPATIENT)
Dept: REHABILITATION | Facility: HOSPITAL | Age: 65
End: 2025-08-22
Payer: COMMERCIAL

## 2025-08-22 DIAGNOSIS — M25.651 HIP STIFFNESS, RIGHT: ICD-10-CM

## 2025-08-22 DIAGNOSIS — R68.89 IMPAIRED TOLERANCE OF ACTIVITY: Primary | ICD-10-CM

## 2025-08-22 PROCEDURE — 97530 THERAPEUTIC ACTIVITIES: CPT | Mod: PN,CQ

## 2025-08-22 PROCEDURE — 97140 MANUAL THERAPY 1/> REGIONS: CPT | Mod: PN,CQ

## 2025-08-22 PROCEDURE — 97112 NEUROMUSCULAR REEDUCATION: CPT | Mod: PN,CQ

## 2025-08-29 ENCOUNTER — CLINICAL SUPPORT (OUTPATIENT)
Dept: REHABILITATION | Facility: HOSPITAL | Age: 65
End: 2025-08-29
Payer: COMMERCIAL

## 2025-08-29 DIAGNOSIS — M25.651 HIP STIFFNESS, RIGHT: ICD-10-CM

## 2025-08-29 DIAGNOSIS — R68.89 IMPAIRED TOLERANCE OF ACTIVITY: Primary | ICD-10-CM

## 2025-08-29 PROCEDURE — 97112 NEUROMUSCULAR REEDUCATION: CPT | Mod: PN

## 2025-08-29 PROCEDURE — 97140 MANUAL THERAPY 1/> REGIONS: CPT | Mod: PN

## 2025-08-29 PROCEDURE — 97530 THERAPEUTIC ACTIVITIES: CPT | Mod: PN

## 2025-09-02 ENCOUNTER — CLINICAL SUPPORT (OUTPATIENT)
Dept: FAMILY MEDICINE | Facility: CLINIC | Age: 65
End: 2025-09-02
Payer: COMMERCIAL

## 2025-09-02 VITALS — SYSTOLIC BLOOD PRESSURE: 118 MMHG | DIASTOLIC BLOOD PRESSURE: 74 MMHG

## 2025-09-02 DIAGNOSIS — I10 PRIMARY HYPERTENSION: ICD-10-CM

## 2025-09-02 DIAGNOSIS — I48.0 PAROXYSMAL ATRIAL FIBRILLATION: ICD-10-CM

## 2025-09-02 DIAGNOSIS — I10 PRIMARY HYPERTENSION: Primary | ICD-10-CM

## 2025-09-02 RX ORDER — DILTIAZEM HYDROCHLORIDE EXTENDED-RELEASE TABLETS 360 MG/1
360 TABLET, EXTENDED RELEASE ORAL DAILY
Qty: 90 TABLET | Refills: 1 | Status: SHIPPED | OUTPATIENT
Start: 2025-09-02

## 2025-09-02 RX ORDER — VALSARTAN 160 MG/1
160 TABLET ORAL DAILY
Qty: 90 TABLET | Refills: 1 | Status: SHIPPED | OUTPATIENT
Start: 2025-09-02 | End: 2026-09-02

## 2025-09-05 ENCOUNTER — CLINICAL SUPPORT (OUTPATIENT)
Dept: REHABILITATION | Facility: HOSPITAL | Age: 65
End: 2025-09-05
Payer: COMMERCIAL

## 2025-09-05 DIAGNOSIS — R68.89 IMPAIRED TOLERANCE OF ACTIVITY: Primary | ICD-10-CM

## 2025-09-05 DIAGNOSIS — M25.651 HIP STIFFNESS, RIGHT: ICD-10-CM

## 2025-09-05 PROCEDURE — 97140 MANUAL THERAPY 1/> REGIONS: CPT | Mod: PN

## 2025-09-05 PROCEDURE — 97530 THERAPEUTIC ACTIVITIES: CPT | Mod: PN

## 2025-09-05 PROCEDURE — 97112 NEUROMUSCULAR REEDUCATION: CPT | Mod: PN
